# Patient Record
Sex: FEMALE | Race: WHITE | Employment: UNEMPLOYED | URBAN - METROPOLITAN AREA
[De-identification: names, ages, dates, MRNs, and addresses within clinical notes are randomized per-mention and may not be internally consistent; named-entity substitution may affect disease eponyms.]

---

## 2020-10-31 ENCOUNTER — APPOINTMENT (OUTPATIENT)
Dept: GENERAL RADIOLOGY | Age: 60
DRG: 247 | End: 2020-10-31
Payer: COMMERCIAL

## 2020-10-31 ENCOUNTER — HOSPITAL ENCOUNTER (INPATIENT)
Age: 60
LOS: 2 days | Discharge: HOME | DRG: 247 | End: 2020-11-02
Attending: EMERGENCY MEDICINE | Admitting: INTERNAL MEDICINE
Payer: COMMERCIAL

## 2020-10-31 PROBLEM — I21.19 ACUTE INFERIOR MYOCARDIAL INFARCTION (HCC): Status: ACTIVE | Noted: 2020-10-31

## 2020-10-31 LAB
ANION GAP SERPL CALCULATED.3IONS-SCNC: 13 MMOL/L (ref 3–16)
APTT: 32.9 SEC (ref 24.2–36.2)
BASOPHILS ABSOLUTE: 0.2 K/UL (ref 0–0.2)
BASOPHILS RELATIVE PERCENT: 1.3 %
BUN BLDV-MCNC: 15 MG/DL (ref 7–20)
CALCIUM SERPL-MCNC: 10.2 MG/DL (ref 8.3–10.6)
CHLORIDE BLD-SCNC: 96 MMOL/L (ref 99–110)
CHOLESTEROL, TOTAL: 278 MG/DL (ref 0–199)
CO2: 22 MMOL/L (ref 21–32)
CREAT SERPL-MCNC: 0.8 MG/DL (ref 0.6–1.1)
EKG ATRIAL RATE: 248 BPM
EKG DIAGNOSIS: NORMAL
EKG P AXIS: 63 DEGREES
EKG Q-T INTERVAL: 320 MS
EKG QRS DURATION: 92 MS
EKG QTC CALCULATION (BAZETT): 459 MS
EKG R AXIS: 54 DEGREES
EKG T AXIS: 86 DEGREES
EKG VENTRICULAR RATE: 124 BPM
EOSINOPHILS ABSOLUTE: 0 K/UL (ref 0–0.6)
EOSINOPHILS RELATIVE PERCENT: 0.2 %
ESTIMATED AVERAGE GLUCOSE: 168.6 MG/DL
GFR AFRICAN AMERICAN: >60
GFR NON-AFRICAN AMERICAN: >60
GLUCOSE BLD-MCNC: 221 MG/DL (ref 70–99)
HBA1C MFR BLD: 7.5 %
HCG QUALITATIVE: NEGATIVE
HCT VFR BLD CALC: 47.3 % (ref 36–48)
HDLC SERPL-MCNC: 38 MG/DL (ref 40–60)
HEMOGLOBIN: 15.3 G/DL (ref 12–16)
LDL CHOLESTEROL CALCULATED: ABNORMAL MG/DL
LDL CHOLESTEROL DIRECT: 162 MG/DL
LV EF: 48 %
LVEF MODALITY: NORMAL
LYMPHOCYTES ABSOLUTE: 2.6 K/UL (ref 1–5.1)
LYMPHOCYTES RELATIVE PERCENT: 19.3 %
MCH RBC QN AUTO: 29.1 PG (ref 26–34)
MCHC RBC AUTO-ENTMCNC: 32.3 G/DL (ref 31–36)
MCV RBC AUTO: 90.3 FL (ref 80–100)
MONOCYTES ABSOLUTE: 0.8 K/UL (ref 0–1.3)
MONOCYTES RELATIVE PERCENT: 6.3 %
NEUTROPHILS ABSOLUTE: 9.7 K/UL (ref 1.7–7.7)
NEUTROPHILS RELATIVE PERCENT: 72.9 %
PDW BLD-RTO: 13.4 % (ref 12.4–15.4)
PLATELET # BLD: 413 K/UL (ref 135–450)
PMV BLD AUTO: 8.4 FL (ref 5–10.5)
POC ACT LR: 234 SEC
POC ACT LR: 276 SEC
POTASSIUM REFLEX MAGNESIUM: 4.1 MMOL/L (ref 3.5–5.1)
PRO-BNP: 2205 PG/ML (ref 0–124)
RBC # BLD: 5.24 M/UL (ref 4–5.2)
SARS-COV-2, NAAT: NOT DETECTED
SODIUM BLD-SCNC: 131 MMOL/L (ref 136–145)
TRIGL SERPL-MCNC: 605 MG/DL (ref 0–150)
TROPONIN: 0.24 NG/ML
VLDLC SERPL CALC-MCNC: ABNORMAL MG/DL
WBC # BLD: 13.3 K/UL (ref 4–11)

## 2020-10-31 PROCEDURE — C1894 INTRO/SHEATH, NON-LASER: HCPCS

## 2020-10-31 PROCEDURE — 83880 ASSAY OF NATRIURETIC PEPTIDE: CPT

## 2020-10-31 PROCEDURE — C1874 STENT, COATED/COV W/DEL SYS: HCPCS

## 2020-10-31 PROCEDURE — 99152 MOD SED SAME PHYS/QHP 5/>YRS: CPT

## 2020-10-31 PROCEDURE — 94640 AIRWAY INHALATION TREATMENT: CPT

## 2020-10-31 PROCEDURE — APPNB45 APP NON BILLABLE 31-45 MINUTES: Performed by: NURSE PRACTITIONER

## 2020-10-31 PROCEDURE — 99223 1ST HOSP IP/OBS HIGH 75: CPT | Performed by: INTERNAL MEDICINE

## 2020-10-31 PROCEDURE — 6370000000 HC RX 637 (ALT 250 FOR IP): Performed by: NURSE PRACTITIONER

## 2020-10-31 PROCEDURE — 6370000000 HC RX 637 (ALT 250 FOR IP): Performed by: EMERGENCY MEDICINE

## 2020-10-31 PROCEDURE — 85025 COMPLETE CBC W/AUTO DIFF WBC: CPT

## 2020-10-31 PROCEDURE — C8929 TTE W OR WO FOL WCON,DOPPLER: HCPCS

## 2020-10-31 PROCEDURE — 85347 COAGULATION TIME ACTIVATED: CPT

## 2020-10-31 PROCEDURE — 2580000003 HC RX 258: Performed by: INTERNAL MEDICINE

## 2020-10-31 PROCEDURE — 94760 N-INVAS EAR/PLS OXIMETRY 1: CPT

## 2020-10-31 PROCEDURE — 6360000002 HC RX W HCPCS: Performed by: EMERGENCY MEDICINE

## 2020-10-31 PROCEDURE — 93010 ELECTROCARDIOGRAM REPORT: CPT | Performed by: INTERNAL MEDICINE

## 2020-10-31 PROCEDURE — 80048 BASIC METABOLIC PNL TOTAL CA: CPT

## 2020-10-31 PROCEDURE — 6370000000 HC RX 637 (ALT 250 FOR IP): Performed by: INTERNAL MEDICINE

## 2020-10-31 PROCEDURE — C1725 CATH, TRANSLUMIN NON-LASER: HCPCS

## 2020-10-31 PROCEDURE — B2111ZZ FLUOROSCOPY OF MULTIPLE CORONARY ARTERIES USING LOW OSMOLAR CONTRAST: ICD-10-PCS | Performed by: INTERNAL MEDICINE

## 2020-10-31 PROCEDURE — C1769 GUIDE WIRE: HCPCS

## 2020-10-31 PROCEDURE — 83721 ASSAY OF BLOOD LIPOPROTEIN: CPT

## 2020-10-31 PROCEDURE — 2500000003 HC RX 250 WO HCPCS: Performed by: EMERGENCY MEDICINE

## 2020-10-31 PROCEDURE — C1760 CLOSURE DEV, VASC: HCPCS

## 2020-10-31 PROCEDURE — B2151ZZ FLUOROSCOPY OF LEFT HEART USING LOW OSMOLAR CONTRAST: ICD-10-PCS | Performed by: INTERNAL MEDICINE

## 2020-10-31 PROCEDURE — U0002 COVID-19 LAB TEST NON-CDC: HCPCS

## 2020-10-31 PROCEDURE — 92941 PRQ TRLML REVSC TOT OCCL AMI: CPT | Performed by: INTERNAL MEDICINE

## 2020-10-31 PROCEDURE — 6360000004 HC RX CONTRAST MEDICATION: Performed by: EMERGENCY MEDICINE

## 2020-10-31 PROCEDURE — 93005 ELECTROCARDIOGRAM TRACING: CPT | Performed by: EMERGENCY MEDICINE

## 2020-10-31 PROCEDURE — 6370000000 HC RX 637 (ALT 250 FOR IP)

## 2020-10-31 PROCEDURE — 96365 THER/PROPH/DIAG IV INF INIT: CPT

## 2020-10-31 PROCEDURE — 6360000002 HC RX W HCPCS: Performed by: INTERNAL MEDICINE

## 2020-10-31 PROCEDURE — 99285 EMERGENCY DEPT VISIT HI MDM: CPT

## 2020-10-31 PROCEDURE — 85730 THROMBOPLASTIN TIME PARTIAL: CPT

## 2020-10-31 PROCEDURE — 2709999900 HC NON-CHARGEABLE SUPPLY

## 2020-10-31 PROCEDURE — 84484 ASSAY OF TROPONIN QUANT: CPT

## 2020-10-31 PROCEDURE — 71045 X-RAY EXAM CHEST 1 VIEW: CPT

## 2020-10-31 PROCEDURE — 4A023N7 MEASUREMENT OF CARDIAC SAMPLING AND PRESSURE, LEFT HEART, PERCUTANEOUS APPROACH: ICD-10-PCS | Performed by: INTERNAL MEDICINE

## 2020-10-31 PROCEDURE — 84703 CHORIONIC GONADOTROPIN ASSAY: CPT

## 2020-10-31 PROCEDURE — 92941 PRQ TRLML REVSC TOT OCCL AMI: CPT

## 2020-10-31 PROCEDURE — 93458 L HRT ARTERY/VENTRICLE ANGIO: CPT

## 2020-10-31 PROCEDURE — 83036 HEMOGLOBIN GLYCOSYLATED A1C: CPT

## 2020-10-31 PROCEDURE — 93458 L HRT ARTERY/VENTRICLE ANGIO: CPT | Performed by: INTERNAL MEDICINE

## 2020-10-31 PROCEDURE — 6360000002 HC RX W HCPCS

## 2020-10-31 PROCEDURE — 2500000003 HC RX 250 WO HCPCS

## 2020-10-31 PROCEDURE — 027034Z DILATION OF CORONARY ARTERY, ONE ARTERY WITH DRUG-ELUTING INTRALUMINAL DEVICE, PERCUTANEOUS APPROACH: ICD-10-PCS | Performed by: INTERNAL MEDICINE

## 2020-10-31 PROCEDURE — 80061 LIPID PANEL: CPT

## 2020-10-31 PROCEDURE — 96375 TX/PRO/DX INJ NEW DRUG ADDON: CPT

## 2020-10-31 PROCEDURE — 6360000004 HC RX CONTRAST MEDICATION: Performed by: NURSE PRACTITIONER

## 2020-10-31 PROCEDURE — C1887 CATHETER, GUIDING: HCPCS

## 2020-10-31 PROCEDURE — 2060000000 HC ICU INTERMEDIATE R&B

## 2020-10-31 RX ORDER — SODIUM CHLORIDE 0.9 % (FLUSH) 0.9 %
10 SYRINGE (ML) INJECTION PRN
Status: DISCONTINUED | OUTPATIENT
Start: 2020-10-31 | End: 2020-11-02 | Stop reason: HOSPADM

## 2020-10-31 RX ORDER — FLUTICASONE PROPIONATE 50 MCG
2 SPRAY, SUSPENSION (ML) NASAL DAILY
Status: DISCONTINUED | OUTPATIENT
Start: 2020-10-31 | End: 2020-11-02 | Stop reason: HOSPADM

## 2020-10-31 RX ORDER — METHOCARBAMOL 750 MG/1
750 TABLET, FILM COATED ORAL 3 TIMES DAILY
COMMUNITY
Start: 2020-09-16

## 2020-10-31 RX ORDER — FUROSEMIDE 40 MG/1
40 TABLET ORAL DAILY
Status: DISCONTINUED | OUTPATIENT
Start: 2020-10-31 | End: 2020-11-02 | Stop reason: HOSPADM

## 2020-10-31 RX ORDER — ALBUTEROL SULFATE 90 UG/1
2 AEROSOL, METERED RESPIRATORY (INHALATION) EVERY 6 HOURS PRN
Status: DISCONTINUED | OUTPATIENT
Start: 2020-10-31 | End: 2020-11-01

## 2020-10-31 RX ORDER — SODIUM CHLORIDE 0.9 % (FLUSH) 0.9 %
10 SYRINGE (ML) INJECTION EVERY 12 HOURS SCHEDULED
Status: DISCONTINUED | OUTPATIENT
Start: 2020-10-31 | End: 2020-11-02 | Stop reason: HOSPADM

## 2020-10-31 RX ORDER — VALSARTAN 80 MG/1
40 TABLET ORAL DAILY
Status: DISCONTINUED | OUTPATIENT
Start: 2020-10-31 | End: 2020-11-02 | Stop reason: HOSPADM

## 2020-10-31 RX ORDER — ASPIRIN 81 MG/1
324 TABLET, CHEWABLE ORAL ONCE
Status: COMPLETED | OUTPATIENT
Start: 2020-10-31 | End: 2020-10-31

## 2020-10-31 RX ORDER — METOPROLOL SUCCINATE 25 MG/1
25 TABLET, EXTENDED RELEASE ORAL DAILY
Status: DISCONTINUED | OUTPATIENT
Start: 2020-10-31 | End: 2020-11-02 | Stop reason: HOSPADM

## 2020-10-31 RX ORDER — ONDANSETRON 2 MG/ML
4 INJECTION INTRAMUSCULAR; INTRAVENOUS EVERY 6 HOURS PRN
Status: DISCONTINUED | OUTPATIENT
Start: 2020-10-31 | End: 2020-11-02 | Stop reason: HOSPADM

## 2020-10-31 RX ORDER — HEPARIN SODIUM 1000 [USP'U]/ML
30 INJECTION, SOLUTION INTRAVENOUS; SUBCUTANEOUS PRN
Status: DISCONTINUED | OUTPATIENT
Start: 2020-10-31 | End: 2020-10-31

## 2020-10-31 RX ORDER — SODIUM CHLORIDE 9 MG/ML
INJECTION, SOLUTION INTRAVENOUS CONTINUOUS
Status: DISCONTINUED | OUTPATIENT
Start: 2020-10-31 | End: 2020-10-31

## 2020-10-31 RX ORDER — ATORVASTATIN CALCIUM 80 MG/1
80 TABLET, FILM COATED ORAL NIGHTLY
Status: DISCONTINUED | OUTPATIENT
Start: 2020-10-31 | End: 2020-11-02 | Stop reason: HOSPADM

## 2020-10-31 RX ORDER — BUDESONIDE AND FORMOTEROL FUMARATE DIHYDRATE 160; 4.5 UG/1; UG/1
2 AEROSOL RESPIRATORY (INHALATION) 2 TIMES DAILY
Status: DISCONTINUED | OUTPATIENT
Start: 2020-10-31 | End: 2020-11-02 | Stop reason: HOSPADM

## 2020-10-31 RX ORDER — FLUTICASONE FUROATE AND VILANTEROL 200; 25 UG/1; UG/1
1 POWDER RESPIRATORY (INHALATION) DAILY
COMMUNITY
Start: 2020-04-21 | End: 2021-10-29

## 2020-10-31 RX ORDER — HEPARIN SODIUM 1000 [USP'U]/ML
4000 INJECTION, SOLUTION INTRAVENOUS; SUBCUTANEOUS ONCE
Status: COMPLETED | OUTPATIENT
Start: 2020-10-31 | End: 2020-10-31

## 2020-10-31 RX ORDER — FUROSEMIDE 40 MG/1
40 TABLET ORAL DAILY
Status: ON HOLD | COMMUNITY
Start: 2020-10-19 | End: 2020-11-02 | Stop reason: SDUPTHER

## 2020-10-31 RX ORDER — IPRATROPIUM BROMIDE AND ALBUTEROL SULFATE 2.5; .5 MG/3ML; MG/3ML
1 SOLUTION RESPIRATORY (INHALATION) EVERY 4 HOURS PRN
Status: DISCONTINUED | OUTPATIENT
Start: 2020-10-31 | End: 2020-11-01

## 2020-10-31 RX ORDER — MONTELUKAST SODIUM 10 MG/1
10 TABLET ORAL NIGHTLY
COMMUNITY
Start: 2020-09-15 | End: 2022-05-12 | Stop reason: ALTCHOICE

## 2020-10-31 RX ORDER — FLUTICASONE PROPIONATE 50 MCG
2 SPRAY, SUSPENSION (ML) NASAL DAILY
COMMUNITY
Start: 2020-10-19

## 2020-10-31 RX ORDER — HEPARIN SODIUM 1000 [USP'U]/ML
60 INJECTION, SOLUTION INTRAVENOUS; SUBCUTANEOUS PRN
Status: DISCONTINUED | OUTPATIENT
Start: 2020-10-31 | End: 2020-10-31

## 2020-10-31 RX ORDER — ALBUTEROL SULFATE 90 UG/1
2 AEROSOL, METERED RESPIRATORY (INHALATION) 4 TIMES DAILY PRN
COMMUNITY
Start: 2020-10-19 | End: 2020-11-17 | Stop reason: SDUPTHER

## 2020-10-31 RX ORDER — ATROPINE SULFATE 0.4 MG/ML
0.5 AMPUL (ML) INJECTION
Status: DISPENSED | OUTPATIENT
Start: 2020-10-31 | End: 2020-10-31

## 2020-10-31 RX ORDER — POTASSIUM CHLORIDE 750 MG/1
10 TABLET, FILM COATED, EXTENDED RELEASE ORAL DAILY
Status: DISCONTINUED | OUTPATIENT
Start: 2020-10-31 | End: 2020-11-02 | Stop reason: HOSPADM

## 2020-10-31 RX ORDER — HEPARIN SODIUM 10000 [USP'U]/100ML
10 INJECTION, SOLUTION INTRAVENOUS CONTINUOUS
Status: DISCONTINUED | OUTPATIENT
Start: 2020-10-31 | End: 2020-10-31 | Stop reason: ALTCHOICE

## 2020-10-31 RX ORDER — POTASSIUM CHLORIDE 750 MG/1
10 TABLET, FILM COATED, EXTENDED RELEASE ORAL DAILY
Status: ON HOLD | COMMUNITY
Start: 2020-09-16 | End: 2020-11-02 | Stop reason: HOSPADM

## 2020-10-31 RX ORDER — MORPHINE SULFATE 4 MG/ML
4 INJECTION, SOLUTION INTRAMUSCULAR; INTRAVENOUS ONCE
Status: COMPLETED | OUTPATIENT
Start: 2020-10-31 | End: 2020-10-31

## 2020-10-31 RX ORDER — ATORVASTATIN CALCIUM 40 MG/1
40 TABLET, FILM COATED ORAL NIGHTLY
Status: DISCONTINUED | OUTPATIENT
Start: 2020-10-31 | End: 2020-10-31

## 2020-10-31 RX ORDER — ASPIRIN 81 MG/1
81 TABLET, CHEWABLE ORAL DAILY
Status: DISCONTINUED | OUTPATIENT
Start: 2020-11-01 | End: 2020-11-02 | Stop reason: HOSPADM

## 2020-10-31 RX ORDER — MONTELUKAST SODIUM 10 MG/1
10 TABLET ORAL NIGHTLY
Status: DISCONTINUED | OUTPATIENT
Start: 2020-10-31 | End: 2020-11-02 | Stop reason: HOSPADM

## 2020-10-31 RX ORDER — EPTIFIBATIDE 0.75 MG/ML
2 INJECTION, SOLUTION INTRAVENOUS CONTINUOUS
Status: DISPENSED | OUTPATIENT
Start: 2020-10-31 | End: 2020-10-31

## 2020-10-31 RX ORDER — FLUTICASONE FUROATE AND VILANTEROL 200; 25 UG/1; UG/1
1 POWDER RESPIRATORY (INHALATION) DAILY
Status: DISCONTINUED | OUTPATIENT
Start: 2020-10-31 | End: 2020-10-31

## 2020-10-31 RX ORDER — ACETAMINOPHEN 325 MG/1
650 TABLET ORAL EVERY 4 HOURS PRN
Status: DISCONTINUED | OUTPATIENT
Start: 2020-10-31 | End: 2020-11-01

## 2020-10-31 RX ORDER — MORPHINE SULFATE 2 MG/ML
2 INJECTION, SOLUTION INTRAMUSCULAR; INTRAVENOUS
Status: ACTIVE | OUTPATIENT
Start: 2020-10-31 | End: 2020-10-31

## 2020-10-31 RX ORDER — PREDNISONE 20 MG/1
TABLET ORAL
Status: ON HOLD | COMMUNITY
Start: 2020-08-18 | End: 2020-10-31

## 2020-10-31 RX ORDER — AMOXICILLIN AND CLAVULANATE POTASSIUM 875; 125 MG/1; MG/1
1 TABLET, FILM COATED ORAL 2 TIMES DAILY
Status: ON HOLD | COMMUNITY
Start: 2020-10-30 | End: 2020-11-02 | Stop reason: HOSPADM

## 2020-10-31 RX ORDER — IPRATROPIUM BROMIDE AND ALBUTEROL SULFATE 2.5; .5 MG/3ML; MG/3ML
SOLUTION RESPIRATORY (INHALATION)
COMMUNITY
Start: 2020-04-06

## 2020-10-31 RX ADMIN — SODIUM CHLORIDE, PRESERVATIVE FREE 10 ML: 5 INJECTION INTRAVENOUS at 21:17

## 2020-10-31 RX ADMIN — EPTIFIBATIDE 2 MCG/KG/MIN: 75 INJECTION INTRAVENOUS at 07:00

## 2020-10-31 RX ADMIN — VALSARTAN 40 MG: 80 TABLET, FILM COATED ORAL at 10:25

## 2020-10-31 RX ADMIN — HEPARIN SODIUM 10 ML/HR: 10000 INJECTION, SOLUTION INTRAVENOUS at 01:41

## 2020-10-31 RX ADMIN — METOPROLOL TARTRATE 25 MG: 25 TABLET, FILM COATED ORAL at 01:44

## 2020-10-31 RX ADMIN — SODIUM CHLORIDE, PRESERVATIVE FREE 10 ML: 5 INJECTION INTRAVENOUS at 07:45

## 2020-10-31 RX ADMIN — TICAGRELOR 90 MG: 90 TABLET ORAL at 21:17

## 2020-10-31 RX ADMIN — IOPAMIDOL 73 ML: 755 INJECTION, SOLUTION INTRAVENOUS at 02:50

## 2020-10-31 RX ADMIN — ATORVASTATIN CALCIUM 80 MG: 80 TABLET, FILM COATED ORAL at 21:17

## 2020-10-31 RX ADMIN — BUDESONIDE AND FORMOTEROL FUMARATE DIHYDRATE 2 PUFF: 160; 4.5 AEROSOL RESPIRATORY (INHALATION) at 12:18

## 2020-10-31 RX ADMIN — FLUTICASONE PROPIONATE 2 SPRAY: 50 SPRAY, METERED NASAL at 10:25

## 2020-10-31 RX ADMIN — BUDESONIDE AND FORMOTEROL FUMARATE DIHYDRATE 2 PUFF: 160; 4.5 AEROSOL RESPIRATORY (INHALATION) at 19:35

## 2020-10-31 RX ADMIN — ALBUTEROL SULFATE 2 PUFF: 108 AEROSOL, METERED RESPIRATORY (INHALATION) at 12:18

## 2020-10-31 RX ADMIN — FUROSEMIDE 40 MG: 40 TABLET ORAL at 10:25

## 2020-10-31 RX ADMIN — ASPIRIN 324 MG: 81 TABLET, CHEWABLE ORAL at 01:37

## 2020-10-31 RX ADMIN — PERFLUTREN 1.65 MG: 6.52 INJECTION, SUSPENSION INTRAVENOUS at 10:01

## 2020-10-31 RX ADMIN — MONTELUKAST 10 MG: 10 TABLET, FILM COATED ORAL at 21:17

## 2020-10-31 RX ADMIN — METOPROLOL SUCCINATE 25 MG: 25 TABLET, EXTENDED RELEASE ORAL at 03:29

## 2020-10-31 RX ADMIN — HEPARIN SODIUM 4000 UNITS: 1000 INJECTION INTRAVENOUS; SUBCUTANEOUS at 01:41

## 2020-10-31 RX ADMIN — MORPHINE SULFATE 4 MG: 4 INJECTION, SOLUTION INTRAMUSCULAR; INTRAVENOUS at 01:37

## 2020-10-31 RX ADMIN — TICAGRELOR 90 MG: 90 TABLET ORAL at 07:45

## 2020-10-31 RX ADMIN — POTASSIUM CHLORIDE 10 MEQ: 750 TABLET, FILM COATED, EXTENDED RELEASE ORAL at 10:25

## 2020-10-31 ASSESSMENT — ENCOUNTER SYMPTOMS
CHEST TIGHTNESS: 0
EYE DISCHARGE: 0
WHEEZING: 0
ABDOMINAL DISTENTION: 0
SHORTNESS OF BREATH: 0
STRIDOR: 0
EYE REDNESS: 0
CHOKING: 0
PHOTOPHOBIA: 0
APNEA: 0
EYE ITCHING: 0
COUGH: 0
EYE PAIN: 0

## 2020-10-31 ASSESSMENT — PAIN SCALES - GENERAL
PAINLEVEL_OUTOF10: 0
PAINLEVEL_OUTOF10: 10
PAINLEVEL_OUTOF10: 0

## 2020-10-31 ASSESSMENT — PAIN DESCRIPTION - ONSET: ONSET: ON-GOING

## 2020-10-31 ASSESSMENT — PAIN DESCRIPTION - PROGRESSION: CLINICAL_PROGRESSION: NOT CHANGED

## 2020-10-31 ASSESSMENT — PAIN DESCRIPTION - LOCATION: LOCATION: CHEST

## 2020-10-31 ASSESSMENT — PAIN DESCRIPTION - DESCRIPTORS: DESCRIPTORS: ACHING;DISCOMFORT

## 2020-10-31 ASSESSMENT — PAIN DESCRIPTION - FREQUENCY: FREQUENCY: CONTINUOUS

## 2020-10-31 ASSESSMENT — PAIN DESCRIPTION - ORIENTATION: ORIENTATION: MID

## 2020-10-31 ASSESSMENT — PAIN - FUNCTIONAL ASSESSMENT: PAIN_FUNCTIONAL_ASSESSMENT: PREVENTS OR INTERFERES SOME ACTIVE ACTIVITIES AND ADLS

## 2020-10-31 NOTE — PROGRESS NOTES
Patient arrived to room 5111 from CVICU. Patient AO X4, ambulatory. Placed on monitor per MD orders. Tele verified with [facility]. Dinner tray ordered. Oriented to room, call light. . Patient denies needs at this time. Call light within reach.

## 2020-10-31 NOTE — H&P
[address]         Reason for Consultation/Chief Complaint: \"I have been having chest pain for 12 hours. \"       History of Present Illness:  Diana Ponce is a 64 y.o. patient who presented to the hospital with complaints of chest pain that started 12 hours ago. .  The patient has asthma and sinusitis. The Chest pain became so severe that she sought medical attention and was immediately found to have an acute inferior STEMI. Past Medical History:   has a past medical history of Asthma, Elevated C-reactive protein (CRP), Hemorrhoid, Hyperlipidemia, Hypertriglyceridemia, Menopause, Osteoarthritis, and Overweight(278.02). Surgical History:   has a past surgical history that includes Tubal ligation (1986). Social History:   reports that she has been smoking cigarettes. She has a 37.00 pack-year smoking history. She has never used smokeless tobacco. She reports that she does not drink alcohol or use drugs. Family History:  No evidence for sudden cardiac death or premature CAD    Home Medications:  Were reviewed and are listed in nursing record. and/or listed below  Prior to Admission medications    Medication Sig Start Date End Date Taking? Authorizing Provider   albuterol sulfate HFA (PROAIR HFA) 108 (90 Base) MCG/ACT inhaler Inhale 2 puffs into the lungs every 6 hours as needed for Wheezing 8/12/18   Taiwo Sahu MD   predniSONE (DELTASONE) 10 MG tablet Take 10 mg by mouth daily    Historical Provider, MD        [facility] Medications:       heparin (PORCINE) Infusion 10 mL/hr (10/31/20 0141)       Allergies:  Penicillins     Review of Systems:     A 14 ROS obtained and negative except as mentioned in HPI. · Constitutional: there has been no unanticipated weight loss. · Eyes: No visual changes or diplopia. No scleral icterus. · ENT: No Headaches, hearing loss or vertigo. No mouth sores or sore throat. · Cardiovascular: No loss of consciousness.  No hemoptysis, pleuritic pain, or phlebitis. Chest pain  · Respiratory: No cough or wheezing, no sputum production. No hematemesis. · Gastrointestinal: No abdominal pain, appetite loss, blood in stools. No change in bowel or bladder habits. · Genitourinary: No dysuria, or hematuria. · Musculoskeletal:  No gait disturbance, weakness or joint complaints. · Integumentary: No rash or pruritis. · Neurological: No headache, diplopia,numbness or tingling. No change in gait, balance, coordination, mood, affect, memory, mentation, behavior. · Psychiatric: No anxiety,  · Endocrine: No malaise,  · Hematologic/Lymphatic: No abnormal bruising  · Allergic/Immunologic: No nasal congestion      Physical Examination:    Vitals:    10/31/20 0205   BP: 122/78   Pulse: 115   Resp: 23   SpO2:     Weight: 189 lb 13.1 oz (86.1 kg)         General Appearance:  Alert, cooperative, no distress, appears stated age   Head:  Normocephalic, without obvious abnormality, atraumatic   Eyes:  PERRL   Nose: Nares normal,   Neck: Supple, JVP normal   Lungs:   Clear to auscultation bilaterally, respirations unlabored   Chest Wall:  No tenderness or deformity   Heart:  Regular rate and rhythm, normal S1, S2 normal, no murmur, No rub. No S3 / S4  gallop   Abdomen:   Soft, non-tender, +bowel sounds   Extremities: no cyanosis, no clubbing , tr edema   Pulses: Symmetric extremities   Skin: no gross lesions or rashes   Pysch: Normal mood and affect   Neurologic: No gross deficits.   CN II - XII grossly intact        Labs  CBC:   Lab Results   Component Value Date    WBC 13.3 10/31/2020    RBC 5.24 10/31/2020    HGB 15.3 10/31/2020    HCT 47.3 10/31/2020    MCV 90.3 10/31/2020    RDW 13.4 10/31/2020     10/31/2020     CMP:    Lab Results   Component Value Date     10/31/2020    K 4.1 10/31/2020    CL 96 10/31/2020    CO2 22 10/31/2020    BUN 15 10/31/2020    CREATININE 0.8 10/31/2020    GFRAA >60 10/31/2020    GFRAA >60 03/28/2011    AGRATIO 1.3 08/12/2018    LABGLOM >60 10/31/2020    GLUCOSE 221 10/31/2020    PROT 7.2 08/12/2018    PROT 7.0 12/01/2010    CALCIUM 10.2 10/31/2020    BILITOT 0.3 08/12/2018    ALKPHOS 79 08/12/2018    AST 16 08/12/2018    ALT 20 08/12/2018     PT/INR:  No results found for: PTINR  Recent Labs     10/31/20  0137   TROPONINI 0.24*       EKG:  SR with inferior ST elevation  Assessment  Patient Active Problem List   Diagnosis    Patient overweight    Hemorrhoid    Osteoarthritis    Hyperlipidemia    Menopause    Vitamin D deficiency        Impression:  Acute inferior MI. Recommendations:    I had the opportunity to review the clinical symptoms and presentation of [address] I recommend that the patient undergo further cardiac evaluation with emergency cardiac catheterization with possible coronary intervention upon her RCA   I recommend that the patient be treated with toprol and integrelin and brillinta and statin  The patient will require ICU managememnt and is critically ill. Tobacco use was discussed with the patient and educated on the negative effects. I have asked the patient to not utilize these agents. Thank you for allowing to us to participate in the care or [address] The note was completed using EMR. Every effort was made to ensure accuracy; however, inadvertent computerized transcription errors may be present.        Ros Kingston MD [address] [facility]

## 2020-10-31 NOTE — CONSULTS
Pt seen on 10/31/20 by Dr. Rohith May, please see consult note.     BEATRIZ Torres  [facility]

## 2020-10-31 NOTE — PROGRESS NOTES
[address]   Daily Progress Note      Admit Date:  10/31/2020    Reason for follow up visit: Inferior MI    CC: \"I feel good this AM.\"    63 y/o female with PMH significant for tobacco use, COPD and mixed dyslipidemia admitted with inferior STEMI. She presented to [facility] with c/o chest pain and ECG showed inferior ST elevation. She was taken to cath lab and underwent placement of stent to occluded RCA. Interval History:  Pt. seen and examined; records reviewed  BP stable. Denies chest pain   Has some chronic SOB and wheezes    Review of Systems:   Did not obtain (seen earlier this AM)    Objective:   /68   Pulse 90   Temp 97.7 °F (36.5 °C) (Oral)   Resp 16   Ht 5' 1\" (1.549 m)   Wt 185 lb 13.6 oz (84.3 kg)   SpO2 98%   BMI 35.12 kg/m²       Intake/Output Summary (Last 24 hours) at 10/31/2020 0903  Last data filed at 10/31/2020 0745  Gross per 24 hour   Intake 10 ml   Output --   Net 10 ml     Wt Readings from Last 3 Encounters:   10/31/20 185 lb 13.6 oz (84.3 kg)   08/12/18 183 lb 6.8 oz (83.2 kg)   11/16/17 181 lb 14.1 oz (82.5 kg)       Physical Exam:  General: In no acute distress. Awake, alert, and oriented X4. Up in chair  Skin:  Warm and dry. No new appearing rashes or lesions. Neck:  Supple. No JVD or carotid bruit appreciated. Chest: Lungs with scattered expiratory wheezes  Cardiovascular:  RRR. Normal S1 and S2. No murmur/gallop or rub   Abdomen:  soft, nontender, nondistended, +bowel sounds. Extremities:  No LE edema. No clubbing or cyanosis. 2+ bilateral radial/DP. PT pulses; cap refill brisk .  R groin site unremarkable    Medications:    sodium chloride flush  10 mL Intravenous 2 times per day    atorvastatin  40 mg Oral Nightly    metoprolol succinate  25 mg Oral Daily    [START ON 11/1/2020] aspirin  81 mg Oral Daily    ticagrelor  90 mg Oral BID      heparin (PORCINE) Infusion Stopped (10/31/20 0300)    sodium chloride 125 mL/hr at 10/31/20 0311    eptifibatide 2 mcg/kg/min (10/31/20 0700)     sodium chloride flush, acetaminophen, atropine, morphine, ondansetron, perflutren lipid microspheres    Lab Data:  CBC:   Recent Labs     10/31/20  0137   WBC 13.3*   HGB 15.3        BMP:    Recent Labs     10/31/20  0137   *   K 4.1   CO2 22   BUN 15   CREATININE 0.8     LIVR: No results for input(s): AST, ALT in the last 72 hours. INR:  No results for input(s): INR in the last 72 hours. APTT:   Recent Labs     10/31/20  0137   APTT 32.9     Results for Yolanda Ruiz (MRN [id number]) as of 10/31/2020 09:07   Ref. Range 10/31/2020 01:37   Pro-BNP Latest Ref Range: 0 - 124 pg/mL 2,205 (H)   Troponin Latest Ref Range: <0.01 ng/mL 0.24 (H)     Results for Yolanda Ruiz (MRN [id number]) as of 10/31/2020 09:07   Ref. Range 10/31/2020 04:40   Cholesterol, Total Latest Ref Range: 0 - 199 mg/dL 278 (H)   HDL Cholesterol Latest Ref Range: 40 - 60 mg/dL 38 (L)   LDL Calculated Latest Ref Range: <100 mg/dL see below   LDL Direct Latest Ref Range: <100 mg/dL 162 (H)   Triglycerides Latest Ref Range: 0 - 150 mg/dL 605 (H)   VLDL Cholesterol Calculated Latest Ref Range: Not Established mg/dL see below     10/31/2020 Echo:  Pending    ECG:  Sinus tachycardia with inferior ST elevation    Cardiac cath 10/31/2020:  LM normal  LCX: 50% OM1  LAD: patent  RCA: subtotal occlusion; EMBER placed (Xience 3.5 x 15 mm)  LVEF 40%; inferior HK    Telemetry: Sinus rhythm-sinus tachycardia    Assessment/Plan:    1. Acute inferior STEMI  -S/P EMBER to RCA  -ischemic cardiomyopathy with LVEF 40%  -no recurrent angina  -continue ASA, Brilinta, Toprol and statin  -add ARB  -check echo    2. Mixed hyperlipidemia  -     -high intensity statin added    3. Hyperglycemia  -sliding scale insulin  -check HgbA1c    4. Tobacco use  -smoking cessation emphasized    5.  COPD  -follows pulmonary at [address]  -nebulizer and inhalers at home      Electronically signed by Baltazar Jaimes ENZWEILER, APRN - CNP on 10/31/2020 at 9:03 AM

## 2020-10-31 NOTE — PROGRESS NOTES
Pt is refusing point of care blood glucose testing and coverage. Pt states she is not diabetic and therefore does not need it. Risks of high blood glucose explained to pt, but pt still declined testing and coverage.     Electronically signed by Juana Carmona RN on 10/31/2020 at 11:53 AM

## 2020-10-31 NOTE — PROGRESS NOTES
0300:  Pt transferred from cath lab, in CVU bed, on portable monitor. Pt admitted to room 1310 in CVU w/ diagnosis of STEMI, after angioplasty and EMBER placed in RCA. R groin angiosealed and currently soft w/o s/s of bleeding or hematoma. Admission assessment and history started. See flow sheets. Denies pain, shortness of breath, n/v or any other needs or c/o. Pt's  brought to bedside and soon after Dr. Felecia Baker arrived to bedside and updated pt and her . Pt oriented to room and CVU routines, including fall precautions. Continuing ot monitor. 0710:  No acute events since admission. Change of shift report given to oncoming day shift RN, Ghazal Cesar (and RM Estrada). Bedside handoff completed at this time.

## 2020-10-31 NOTE — ED PROVIDER NOTES
[address] [facility]      Pt Name: Yao Hill  MRN: [id number]  [place] 1960  Date of evaluation: 10/31/2020  Provider: Francois Gan MD    CHIEF COMPLAINT     Chest pain    HISTORY OF PRESENT ILLNESS    Yao Hill is a 64 y.o. female who presents to the emergency department with chest pain. Substernal chest pain for 15 hours. Pain is acute 9/10 pressure like and constant in nature. Pressing on her chest makes symptoms better. This has never happened before. No other associated symptoms other than previously mentioned. Nursing Notes were reviewed. Including nursing noted for FM, Surgical History, Past Medical History, Social History, vitals, and allergies; agree with all. REVIEW OF SYSTEMS       Review of Systems   Constitutional: Negative for activity change, appetite change, chills, diaphoresis, fatigue, fever and unexpected weight change. HENT: Negative for congestion, dental problem, drooling and ear discharge. Eyes: Negative for photophobia, pain, discharge, redness and itching. Respiratory: Negative for apnea, cough, choking, chest tightness, shortness of breath, wheezing and stridor. Cardiovascular: Positive for chest pain. Negative for leg swelling. Gastrointestinal: Negative for abdominal distention. Endocrine: Negative for polyphagia and polyuria. Genitourinary: Negative for vaginal bleeding, vaginal discharge and vaginal pain. Musculoskeletal: Negative for arthralgias. Neurological: Negative for dizziness, facial asymmetry and headaches. Hematological: Negative for adenopathy. Does not bruise/bleed easily. Psychiatric/Behavioral: Negative for agitation, behavioral problems, confusion, decreased concentration, dysphoric mood, hallucinations, self-injury, sleep disturbance and suicidal ideas. The patient is not nervous/anxious and is not hyperactive.       Except as noted above the remainder of the file    Stress: Not on file   Relationships    Social connections     Talks on phone: Not on file     Gets together: Not on file     Attends [employer] service: Not on file     Active member of club or organization: Not on file     Attends meetings of clubs or organizations: Not on file     Relationship status: Not on file    Intimate partner violence     Fear of current or ex partner: Not on file     Emotionally abused: Not on file     Physically abused: Not on file     Forced sexual activity: Not on file   Other Topics Concern    Not on file   Social History Narrative    Not on file       PHYSICAL EXAM       Vitals:    10/31/20 0131   BP: (!) 150/95   Pulse: 132   Resp: 30   SpO2: 95%   Weight: 189 lb 13.1 oz (86.1 kg)   Height: 5' 1\" (1.549 m)     Physical Exam  Vitals signs and nursing note reviewed. Constitutional:       General: She is in acute distress. Appearance: She is well-developed. She is ill-appearing. She is not toxic-appearing or diaphoretic. HENT:      Head: Normocephalic and atraumatic. Right Ear: External ear normal.      Left Ear: External ear normal.   Eyes:      General:         Right eye: No discharge. Left eye: No discharge. Conjunctiva/sclera: Conjunctivae normal.      Pupils: Pupils are equal, round, and reactive to light. Neck:      Musculoskeletal: Normal range of motion and neck supple. Cardiovascular:      Rate and Rhythm: Regular rhythm. Tachycardia present. Heart sounds: No murmur. Pulmonary:      Effort: Pulmonary effort is normal. No respiratory distress. Breath sounds: Normal breath sounds. No wheezing or rales. Abdominal:      General: Bowel sounds are normal. There is no distension. Palpations: Abdomen is soft. There is no mass. Tenderness: There is no abdominal tenderness. There is no guarding or rebound. Genitourinary:     Comments: Deferred  Musculoskeletal: Normal range of motion. General: No deformity. Skin:     General: Skin is warm. Findings: No erythema or rash. Neurological:      Mental Status: She is alert and oriented to person, place, and time. She is not disoriented. Cranial Nerves: No cranial nerve deficit. Motor: No atrophy or abnormal muscle tone. Coordination: Coordination normal.   Psychiatric:         Behavior: Behavior normal.         Thought Content: Thought content normal.       DIAGNOSTIC RESULTS     EKG: All EKG's are interpreted by the Emergency Department Physician who either signs or Co-signs this chart in the absence of acardiologist.    EKG shows STEMI inferior leads with reciprocal changes     RADIOLOGY:   Non-plain film images such as CT, Ultrasoundand MRI are read by the radiologist. Plain radiographic images are visualized and preliminarily interpreted by the emergency physician with the below findings:    CXR clear     ED BEDSIDE ULTRASOUND:   Performed by ED Physician - none    LABS:  Labs Reviewed   CBC WITH AUTO DIFFERENTIAL   BASIC METABOLIC PANEL W/ REFLEX TO MG FOR LOW K   TROPONIN   BRAIN NATRIURETIC PEPTIDE   HCG, SERUM, QUALITATIVE   APTT   APTT   COVID-19   COVID-19   COVID-19   COVID-19   COVID-19     All other labs were withinnormal range or not returned as of this dictation. EMERGENCY DEPARTMENT COURSE and DIFFERENTIAL DIAGNOSIS/MDM:     PMH, Surgical Hx, FH, Social Hx reviewed by myself (ETOH usage, Tobacco usage, Drug usage reviewed by myself, no pertinent Hx)- No Pertinent Hx     Old records were reviewed by me    STEMI    ASA    BB    Heparin    Cath Lab    Dr Arriola  emergently coming in    [facility] 27 was 41 minutes, excluding separately reportable procedures. There was a high probability of clinically significant/life threatening deterioration in the patient's condition which required my urgent intervention.         PROCEDURES:  Unlessotherwise noted below, none    FINAL IMPRESSION      1. ST elevation myocardial infarction (STEMI), unspecified artery [facility])          DISPOSITION/PLAN   DISPOSITION      Cath lab    (Please note that portions ofthis note were completed with a voice recognition program.  Efforts were made to edit the dictations but occasionally words are mis-transcribed.)    Debra Rees MD(electronically signed)  Attending Emergency Physician        Debra Rees MD  10/31/20 0911

## 2020-10-31 NOTE — PROCEDURES
[address] [place] [address]                            CARDIAC CATHETERIZATION    PATIENT NAME: Stephanie Oakley                  :        1960  MED REC NO:   [id number]                          ROOM:       [address]:   [de-identified]                           ADMIT DATE: 10/31/2020  PROVIDER:     Anshu Armando. Clarence Mon MD    DATE OF PROCEDURE:  10/31/2020    INDICATION FOR PROCEDURE:  The patient presented with severe chest pain. The chest pain had been ongoing for 12-15 hours at home prior to  presentation. She was triaged and found to have an acute inferior ST  elevation MI with Q-waves in II, III and AVF and reciprocal depressions  in leads V1, V2, V3. She requires emergent angiography for  risk-stratification intervention. Mallampati 2. ASA 2. PROCEDURE:  Prepped and draped in a sterile manner, locally anesthetized  with 1% lidocaine in the right femoral region. A 6-Dominican sheath was  placed in a retrograde manner in the right femoral artery over a  guidewire. JL4, 6-Dominican JR4 guide, and pigtail catheter were used  during the diagnostic procedure. All were aspirated and flushed prior  to use. All catheters were advanced under fluoroscopic guidance over a  guidewire and retracted over a guidewire. FINDINGS:  The left main coronary artery is normal.  It bifurcates and  gives off to left anterior descending and left circumflex coronary  arteries. The left circumflex coronary artery is normal proximally. The circumflex continues in the AV groove. There is on a bend of obtuse  marginal branch #1. She has a 50% stenosis. It is not flow-limiting. It is not the culprit artery. The left anterior descending coronary  artery possesses JIE III flow. There is no significant obstructive  disease identified in the LAD. There is JIE grade III flow. There is  some tortuosity.   The LAD courses around the apex of the left ventricle  and feeds a minimal portion of the inferior apex. The JR4 guide was used to engage the right coronary artery. Injection  shows that the ostium of the right coronary artery is patent. In the  proximal vessel for the first bend, there is a subtotal occlusion of the  right coronary artery. It is 99 plus percent stenosed. The right  posterolateral branch of the right coronary artery is large and  angiographically unremarkable. The right posterior descending is  somewhat small but no obstructive disease identified. There are two RV  branches that are unremarkable. We immediately went with PCI upon the right coronary artery.   wire was advanced beyond the 90% stenosis. The patient was given 1000  units of heparin and Integrilin double bolus was started per protocol. After the wire was advanced easily into the right coronary artery, a 2.5  x 12-mm balloon was placed across the stenosis and inflated to 12  atmospheres x25 seconds. There was JIE III blood flow reestablished  with residual 90% stenosis. I then took a 3.5 mm x 15 mm length Xience  Abby drug-eluting stent placed across the residual stenosis and then  inflated it to 12 atmospheres x30 seconds. There was JIE III blood  flow with 0% residual stenosis. There was no dissection or dye  extravasation. The angioplasty hardware was removed. The patient was  pain free at the conclusion of the procedure. A pigtail catheter in the left ventricle post angiography shows a left  ventricular end-diastolic pressure of 14 mmHg. Power injection in BUITRAGO  projection shows hypokinesia of most of the inferior wall from the base  to the mid portion of the inferior wall. The inferior apex is  alejandro. The LV ejection fraction is estimated at 40%. There was  no mitral regurgitation. There was about a 5-mm gradient upon pullback  across the aortic valve.     Injection of the femoral sheath showed the sheath was located squarely  in the right common femoral artery. For the patient's safety and  comfort, a 6-Greenlandic Angio-Seal device was deployed and hemostasis was  achieved. CONCLUSION:  Successful PTCA stenting of a subtotally occluded right  coronary artery. The patient was chest pain free after deployment of a  3.5 mm x 15 mm Xience Abby drug-eluting stent. No dissection or dye  extravasation was noted. There was JIE grade III flow with nearly 0%  residual stenosis. There is a 40% stenosis of obtuse marginal branch  #1. It is not the culprit vessel. There is JIE grade III flow there. The remaining coronary arteries appear to be essentially normal.  LV  ejection fraction 40%, inferior wall hypokinesia. PLAN:  Hydration, bed rest.  Risk factor modification. ICU management. Six hours Integrilin. Brilinta was given on the table 180 mg. EBL < 25 ml.         Sarina Means MD    D: 10/31/2020 3:06:26       T: 10/31/2020 5:58:11     [email or web address]  Job#: [id number]     Doc#: [id number]    CC:

## 2020-10-31 NOTE — PROGRESS NOTES
1730 - Report called to PCU RN. Pt to transfer to room 5111.     1800 - Pt transported to room 5111. Pt belongings transported with pt and placed in pt closet in room. Pt left in stable condition.      Electronically signed by Jen Michael RN on 10/31/2020 at 6:05 PM

## 2020-10-31 NOTE — PLAN OF CARE
Brief Pre-Op Note/Sedation Assessment      Mariaa Swain  1960  [id number]      [id number]  2:57 AM    Planned Procedure: Cardiac Catheterization Procedure    Post Procedure Plan: Return to same level of care    Consent: I have discussed with the patient and/or the patient representative the indication, alternatives, and the possible risks and/or complications of the planned procedure and the anesthesia methods. The patient and/or patient representative appear to understand and agree to proceed. Chief Complaint: STEMI      Indications for Cath Procedure:  ACS <= 24 hrs  Anginal Classification within 2 weeks:  CCS IV - Inability to perform any activity without angina or angina at rest, i.e., severe limitation  NYHA Heart Failure Class within 2 weeks: No symptoms  Is Cath Lab Visit Valve-related?: No  Surgical Risk: Low  Functional Type: Unknown    Anti- Anginal Meds within 2 weeks:   No: Contraindicated  none taken. no contraindication. Stress or Imaging Studies Performed (within 6 months):  None     Vital Signs:  /78   Pulse 115   Resp 23   Ht 5' 1\" (1.549 m)   Wt 189 lb 13.1 oz (86.1 kg)   SpO2 100%   BMI 35.87 kg/m²     Allergies:   Allergies   Allergen Reactions    Penicillins      Reports \"as a child and is not allergic anymore\"       Past Medical History:  Past Medical History:   Diagnosis Date    Asthma     Elevated C-reactive protein (CRP) 3/28/11    hsCRP-6.02    Hemorrhoid     Hyperlipidemia 1/5/07    dyslipidemia (HDL-47), TC-216; HDL-42 & TC-213 on 3/28/11    Hypertriglyceridemia 1/5/07    TG's- 303; TG's-304 on 3/28/11    Menopause 2006    Osteoarthritis     ankles, knees    Overweight(278.02)          Surgical History:  Past Surgical History:   Procedure Laterality Date    TUBAL LIGATION  1986         Medications:  Current Facility-Administered Medications   Medication Dose Route Frequency Provider Last Rate Last Dose    heparin 25,000 unit in sodium

## 2020-11-01 LAB
ANION GAP SERPL CALCULATED.3IONS-SCNC: 18 MMOL/L (ref 3–16)
BUN BLDV-MCNC: 15 MG/DL (ref 7–20)
CALCIUM SERPL-MCNC: 9.5 MG/DL (ref 8.3–10.6)
CHLORIDE BLD-SCNC: 95 MMOL/L (ref 99–110)
CO2: 21 MMOL/L (ref 21–32)
CREAT SERPL-MCNC: 0.8 MG/DL (ref 0.6–1.1)
GFR AFRICAN AMERICAN: >60
GFR NON-AFRICAN AMERICAN: >60
GLUCOSE BLD-MCNC: 141 MG/DL (ref 70–99)
GLUCOSE BLD-MCNC: 145 MG/DL (ref 70–99)
GLUCOSE BLD-MCNC: 145 MG/DL (ref 70–99)
GLUCOSE BLD-MCNC: 152 MG/DL (ref 70–99)
GLUCOSE BLD-MCNC: 193 MG/DL (ref 70–99)
HCT VFR BLD CALC: 43.3 % (ref 36–48)
HEMOGLOBIN: 14.7 G/DL (ref 12–16)
MCH RBC QN AUTO: 30.1 PG (ref 26–34)
MCHC RBC AUTO-ENTMCNC: 33.9 G/DL (ref 31–36)
MCV RBC AUTO: 88.7 FL (ref 80–100)
PDW BLD-RTO: 13.6 % (ref 12.4–15.4)
PERFORMED ON: ABNORMAL
PLATELET # BLD: 372 K/UL (ref 135–450)
PMV BLD AUTO: 8.3 FL (ref 5–10.5)
POTASSIUM REFLEX MAGNESIUM: 3.6 MMOL/L (ref 3.5–5.1)
RBC # BLD: 4.88 M/UL (ref 4–5.2)
SODIUM BLD-SCNC: 134 MMOL/L (ref 136–145)
WBC # BLD: 11.6 K/UL (ref 4–11)

## 2020-11-01 PROCEDURE — 93005 ELECTROCARDIOGRAM TRACING: CPT | Performed by: INTERNAL MEDICINE

## 2020-11-01 PROCEDURE — 36415 COLL VENOUS BLD VENIPUNCTURE: CPT

## 2020-11-01 PROCEDURE — 99233 SBSQ HOSP IP/OBS HIGH 50: CPT | Performed by: NURSE PRACTITIONER

## 2020-11-01 PROCEDURE — 80048 BASIC METABOLIC PNL TOTAL CA: CPT

## 2020-11-01 PROCEDURE — 2580000003 HC RX 258: Performed by: INTERNAL MEDICINE

## 2020-11-01 PROCEDURE — 6370000000 HC RX 637 (ALT 250 FOR IP): Performed by: INTERNAL MEDICINE

## 2020-11-01 PROCEDURE — 6370000000 HC RX 637 (ALT 250 FOR IP): Performed by: NURSE PRACTITIONER

## 2020-11-01 PROCEDURE — 85027 COMPLETE CBC AUTOMATED: CPT

## 2020-11-01 PROCEDURE — 94640 AIRWAY INHALATION TREATMENT: CPT

## 2020-11-01 PROCEDURE — 94760 N-INVAS EAR/PLS OXIMETRY 1: CPT

## 2020-11-01 PROCEDURE — 2060000000 HC ICU INTERMEDIATE R&B

## 2020-11-01 RX ORDER — ALBUTEROL SULFATE 90 UG/1
2 AEROSOL, METERED RESPIRATORY (INHALATION) EVERY 4 HOURS PRN
Status: DISCONTINUED | OUTPATIENT
Start: 2020-11-01 | End: 2020-11-02 | Stop reason: HOSPADM

## 2020-11-01 RX ORDER — ACETAMINOPHEN 325 MG/1
650 TABLET ORAL EVERY 6 HOURS
Status: DISCONTINUED | OUTPATIENT
Start: 2020-11-01 | End: 2020-11-02 | Stop reason: HOSPADM

## 2020-11-01 RX ORDER — ALBUTEROL SULFATE 90 UG/1
2 AEROSOL, METERED RESPIRATORY (INHALATION) 4 TIMES DAILY
Status: DISCONTINUED | OUTPATIENT
Start: 2020-11-01 | End: 2020-11-01

## 2020-11-01 RX ORDER — IPRATROPIUM BROMIDE AND ALBUTEROL SULFATE 2.5; .5 MG/3ML; MG/3ML
1 SOLUTION RESPIRATORY (INHALATION) 4 TIMES DAILY
Status: DISCONTINUED | OUTPATIENT
Start: 2020-11-01 | End: 2020-11-02 | Stop reason: HOSPADM

## 2020-11-01 RX ADMIN — BUDESONIDE AND FORMOTEROL FUMARATE DIHYDRATE 2 PUFF: 160; 4.5 AEROSOL RESPIRATORY (INHALATION) at 19:36

## 2020-11-01 RX ADMIN — ASPIRIN 81 MG: 81 TABLET, CHEWABLE ORAL at 08:54

## 2020-11-01 RX ADMIN — ATORVASTATIN CALCIUM 80 MG: 80 TABLET, FILM COATED ORAL at 20:43

## 2020-11-01 RX ADMIN — MONTELUKAST 10 MG: 10 TABLET, FILM COATED ORAL at 20:43

## 2020-11-01 RX ADMIN — TICAGRELOR 90 MG: 90 TABLET ORAL at 08:54

## 2020-11-01 RX ADMIN — IPRATROPIUM BROMIDE AND ALBUTEROL SULFATE 1 AMPULE: .5; 3 SOLUTION RESPIRATORY (INHALATION) at 16:35

## 2020-11-01 RX ADMIN — VALSARTAN 40 MG: 80 TABLET, FILM COATED ORAL at 08:55

## 2020-11-01 RX ADMIN — IPRATROPIUM BROMIDE AND ALBUTEROL SULFATE 1 AMPULE: .5; 3 SOLUTION RESPIRATORY (INHALATION) at 11:25

## 2020-11-01 RX ADMIN — FUROSEMIDE 40 MG: 40 TABLET ORAL at 08:54

## 2020-11-01 RX ADMIN — SODIUM CHLORIDE, PRESERVATIVE FREE 10 ML: 5 INJECTION INTRAVENOUS at 08:56

## 2020-11-01 RX ADMIN — INSULIN LISPRO 1 UNITS: 100 INJECTION, SOLUTION INTRAVENOUS; SUBCUTANEOUS at 20:48

## 2020-11-01 RX ADMIN — SODIUM CHLORIDE, PRESERVATIVE FREE 10 ML: 5 INJECTION INTRAVENOUS at 20:43

## 2020-11-01 RX ADMIN — BUDESONIDE AND FORMOTEROL FUMARATE DIHYDRATE 2 PUFF: 160; 4.5 AEROSOL RESPIRATORY (INHALATION) at 08:03

## 2020-11-01 RX ADMIN — IPRATROPIUM BROMIDE AND ALBUTEROL SULFATE 1 AMPULE: .5; 3 SOLUTION RESPIRATORY (INHALATION) at 19:36

## 2020-11-01 RX ADMIN — POTASSIUM CHLORIDE 10 MEQ: 750 TABLET, FILM COATED, EXTENDED RELEASE ORAL at 08:55

## 2020-11-01 RX ADMIN — METOPROLOL SUCCINATE 25 MG: 25 TABLET, EXTENDED RELEASE ORAL at 08:55

## 2020-11-01 RX ADMIN — TICAGRELOR 90 MG: 90 TABLET ORAL at 20:43

## 2020-11-01 RX ADMIN — FLUTICASONE PROPIONATE 2 SPRAY: 50 SPRAY, METERED NASAL at 08:54

## 2020-11-01 ASSESSMENT — PAIN SCALES - GENERAL
PAINLEVEL_OUTOF10: 0

## 2020-11-01 NOTE — PLAN OF CARE
Problem: Discharge Planning:  Goal: Discharged to appropriate level of care  Description: Discharged to appropriate level of care  11/1/2020 1434 by Timmy Richardson RN  Outcome: Ongoing     Problem: Pain - Acute:  Goal: Pain level will decrease  Description: Pain level will decrease  11/1/2020 1434 by Timmy Richardson RN  Outcome: Ongoing     Problem: Fluid Volume - Imbalance:  Goal: Will show no signs and symptoms of excessive bleeding  Description: Will show no signs and symptoms of excessive bleeding  11/1/2020 1434 by Timmy Richardson RN  Outcome: Ongoing     Problem: Fluid Volume - Imbalance:  Goal: Absence of imbalanced fluid volume signs and symptoms  Description: Absence of imbalanced fluid volume signs and symptoms  11/1/2020 1434 by Timmy Richardson RN  Outcome: Ongoing     Problem: Anxiety:  Goal: Level of anxiety will decrease  Description: Level of anxiety will decrease  11/1/2020 1434 by Timmy Richardson RN  Outcome: Ongoing     Problem: Cardiac Output - Decreased:  Goal: Cardiac output within specified parameters  Description: Cardiac output within specified parameters  11/1/2020 1434 by Timmy Richardson RN  Outcome: Ongoing     Problem: Serum Glucose Level - Abnormal:  Goal: Ability to maintain appropriate glucose levels will improve  Description: Ability to maintain appropriate glucose levels will improve  11/1/2020 1434 by Timmy Richardson RN  Outcome: Ongoing     Problem: Tissue Perfusion - Cardiopulmonary, Altered:  Goal: Circulatory function within specified parameters  Description: Circulatory function within specified parameters  11/1/2020 1434 by Timmy Richardson RN  Outcome: Ongoing

## 2020-11-01 NOTE — CONSULTS
Hospital Medicine  Consult History & Physical        Chief Complaint: Wheezing    Date of Service: Pt seen/examined in consultation on 11/1/2020    History Of Present Illness:      64 y.o. female who we are asked to see/evaluate by No att. providers found for medical management of elevated A1c hyperlipidemia and COPD. Was admitted for severe chest pain and was found to have an inferior STEMI and underwent stent placement. Patient has a lot of shortness of breath and wheezing. No chest pain at present. She has no documented history of diabetes or hyperlipidemia    Past Medical History:        Diagnosis Date    Asthma     Elevated C-reactive protein (CRP) 3/28/11    hsCRP-6.02    Hemorrhoid     Hyperlipidemia 1/5/07    dyslipidemia (HDL-47), TC-216; HDL-42 & TC-213 on 3/28/11    Hypertriglyceridemia 1/5/07    TG's- 303; TG's-304 on 3/28/11    Menopause 2006    Osteoarthritis     ankles, knees    Overweight(278.02)        Past Surgical History:        Procedure Laterality Date    TUBAL LIGATION  1986       Medications Prior to Admission:    Prior to Admission medications    Medication Sig Start Date End Date Taking?  Authorizing Provider   amoxicillin-clavulanate (AUGMENTIN) 875-125 MG per tablet Take 1 tablet by mouth 2 times daily 10/30/20 11/5/20 Yes Historical Provider, MD   Fluticasone furoate-vilanterol (BREO ELLIPTA) 200-25 MCG/INH AEPB inhaler Inhale 1 Inhaler into the lungs daily 4/21/20  Yes Historical Provider, MD   fluticasone (FLONASE) 50 MCG/ACT nasal spray 2 sprays by Nasal route daily 10/19/20  Yes Historical Provider, MD   furosemide (LASIX) 40 MG tablet Take 40 mg by mouth daily 10/19/20  Yes Historical Provider, MD   ipratropium-albuterol (DUONEB) 0.5-2.5 (3) MG/3ML SOLN nebulizer solution USE 3 MLS WITH NEBULIZER FOUR TIMES DAILY 4/6/20  Yes Historical Provider, MD   methocarbamol (ROBAXIN) 750 MG tablet Take 750 mg by mouth 3 times daily 9/16/20  Yes Historical Provider, MD montelukast (SINGULAIR) 10 MG tablet Take 10 mg by mouth nightly 9/15/20  Yes Historical Provider, MD   potassium chloride (KLOR-CON) 10 MEQ extended release tablet Take 10 mEq by mouth daily 9/16/20  Yes Historical Provider, MD   albuterol sulfate  (90 Base) MCG/ACT inhaler Inhale 2 puffs into the lungs 4 times daily as needed 10/19/20  Yes Historical Provider, MD   albuterol sulfate HFA (PROAIR HFA) 108 (90 Base) MCG/ACT inhaler Inhale 2 puffs into the lungs every 6 hours as needed for Wheezing 8/12/18  Yes Dominick Grove MD   predniSONE (DELTASONE) 10 MG tablet Take 10 mg by mouth daily   Yes Historical Provider, MD       Allergies:  Penicillins    Social History:          TOBACCO:   reports that she has been smoking cigarettes. She has a 37.00 pack-year smoking history. She has never used smokeless tobacco.  ETOH:   reports no history of alcohol use. Family History:           Problem Relation Age of Onset    Heart Disease Brother 21        multiple MI's (25) and pacer    Heart Disease Paternal Grandmother         pacer    Cancer Brother 18        bone       REVIEW OF SYSTEMS:   Pertinent positives as noted in the HPI. All other systems reviewed and negative. PHYSICAL EXAM PERFORMED:  /79   Pulse 91   Temp 97.5 °F (36.4 °C) (Oral)   Resp 20   Ht 5' 1\" (1.549 m)   Wt 183 lb 10.3 oz (83.3 kg)   SpO2 96%   BMI 34.70 kg/m²   General appearance: No apparent distress, appears stated age and cooperative. HEENT: Normal cephalic, atraumatic without obvious deformity. Pupils equal, round, and reactive to light. Extra ocular muscles intact. Conjunctivae/corneas clear. Neck: Supple, with full range of motion. No jugular venous distention. Trachea midline. Respiratory: Air entry okay. Bilateral wheezing and rhonchi. Cardiovascular: Regular rate and rhythm with normal S1/S2 without murmurs, rubs or gallops.   Abdomen: Soft, non-tender, non-distended with normal bowel sounds. Musculoskeletal:No clubbing, cyanosis or edema bilaterally. Skin: Skin color, texture, turgor normal.  No rashes or lesions. Neurologic:  Neurovascularly intact without any focal sensory/motor deficits. Cranial nerves: II-XII intact, grossly non-focal.  Psychiatric: Alert and oriented, thought content appropriate, normal insight  Capillary Refill: Brisk,< 3 seconds   Peripheral Pulses: +2 palpable, equal bilaterally     Labs:     Recent Labs     10/31/20  0137 11/01/20  0537   WBC 13.3* 11.6*   HGB 15.3 14.7   HCT 47.3 43.3    372     Recent Labs     10/31/20  0137 11/01/20  0537   * 134*   K 4.1 3.6   CL 96* 95*   CO2 22 21   BUN 15 15   CREATININE 0.8 0.8   CALCIUM 10.2 9.5     No results for input(s): AST, ALT, BILIDIR, BILITOT, ALKPHOS in the last 72 hours. No results for input(s): INR in the last 72 hours. Recent Labs     10/31/20  0137   TROPONINI 0.24*       Urinalysis:  No results found for: Ceola Beery, BACTERIA, RBCUA, BLOODU, SPECGRAV, GLUCOSEU    Radiology: I have reviewed the radiology reports with the following interpretation:     XR CHEST PORTABLE   Final Result   Stable chest x-ray. No acute disease. EKG:  I have reviewed the EKG with the following interpretation:    @[email or web address]@      ASSESSMENT:    Active Hospital Problems    Diagnosis Date Noted    New onset type 2 diabetes mellitus ([facility] [address] [E11.9]     Tobacco use [Z72.0]     Acute inferior myocardial infarction [facility]) [I21.19] 10/31/2020    Acute deep vein thrombosis (DVT) of inferior vena cava (HCC) [I82.220]     ST elevation myocardial infarction (STEMI) ([facility]) [I21.3]        PLAN:    STEMI   -S/p stent placement  -Continue aspirin Brilinta and statin  -Beta-blocker added    COPD  -Sees pulmonology at [facility]  -Started on duo nebs Symbicort  -Avoid steroids.   If patient's respiratory status does not improve will add    LV dysfunction  -On Diovan    New diagnosis of diabetes  -A1c 7.5  -Metformin added  -Diabetic educator consulted    Hyperlipidemia  -Continue statin    DVT Prophylaxis: Lovenox  Diet: DIET CARDIAC;  Code Status: Full Code    PT/OT Eval Status: Active and ongoing      Thank you for the consultation, will follow up .     Electronically signed by Gualberto Plascencia MD on 11/1/20 at 12:50 PM EST

## 2020-11-01 NOTE — PROGRESS NOTES
[facility] [address]   Daily Progress Note      Admit Date:  10/31/2020    Reason for follow up visit: Inferior MI    CC: \"I feel more SOB today. \"    65 y/o female with PMH significant for tobacco use, COPD and mixed dyslipidemia admitted with inferior STEMI. She presented to [facility] with c/o chest pain and ECG showed inferior ST elevation. She was taken to cath lab and underwent placement of stent to occluded RCA. While here her BS has been elevated and A1c performed which was elevated at 7.5. Interval History:  Pt. seen and examined; records reviewed  BP reviewed. A1c 7.5 (hospitalist consulted)  Increased SOB and wheezing today  Denies chest pain    Subjective:  Pt with no acute overnight cardiac events. Denies chest pain, palpitations or dizziness  + SOB worse this AM  + productive cough    Review of Systems:   · Constitutional: no unanticipated weight loss. There's been no change in energy level, sleep pattern, or activity level. No fevers, chills. · Eyes: No visual changes or diplopia. No scleral icterus. · ENT: No Headaches, hearing loss or vertigo. No mouth sores or sore throat. · Cardiovascular: as reviewed in HPI  · Respiratory: + chronic cough, SOB  · Gastrointestinal: No abdominal pain, appetite loss, blood in stools. No change in bowel or bladder habits. · Genitourinary: No dysuria, trouble voiding, or hematuria. · Musculoskeletal:  No gait disturbance, no joint complaints. · Integumentary: No rash or pruritis. · Neurological: No headache, diplopia, change in muscle strength, numbness or tingling. · Psychiatric: No anxiety or depression. · Endocrine: No temperature intolerance. No excessive thirst, fluid intake, or urination. No tremor. · Hematologic/Lymphatic: No abnormal bruising or bleeding, blood clots or swollen lymph nodes. · Allergic/Immunologic: No nasal congestion or hives.     Objective:   /72   Pulse 96   Temp 97.5 °F (36.4 °C) (Oral)   Resp 20   Ht 5' 1\" (1.549 m)   Wt 183 lb 10.3 oz (83.3 kg)   SpO2 94%   BMI 34.70 kg/m²   No intake or output data in the 24 hours ending 11/01/20 1011  Wt Readings from Last 3 Encounters:   11/01/20 183 lb 10.3 oz (83.3 kg)   08/12/18 183 lb 6.8 oz (83.2 kg)   11/16/17 181 lb 14.1 oz (82.5 kg)       Physical Exam:  General: In no acute distress. Awake, alert, and oriented X4. Increased dyspnea with exertion. Skin:  Warm and dry. No new appearing rashes or lesions. Neck:  Supple. No JVD or carotid bruit appreciated. Chest: Lungs with expiratory wheezes  Cardiovascular:  RRR. Normal S1 and S 2. No murmur/gallop or rub  Abdomen:  soft, nontender, nondistended, +bowel sounds. No hepatomegaly  Extremities:  No LE edema. No clubbing or cyanosis. 2+ bilateral radial/DP/PT pulses. Cap refill brisk. Medications:    sodium chloride flush  10 mL Intravenous 2 times per day    metoprolol succinate  25 mg Oral Daily    aspirin  81 mg Oral Daily    ticagrelor  90 mg Oral BID    insulin lispro  0-6 Units Subcutaneous TID WC    insulin lispro  0-3 Units Subcutaneous Nightly    atorvastatin  80 mg Oral Nightly    fluticasone  2 spray Nasal Daily    furosemide  40 mg Oral Daily    montelukast  10 mg Oral Nightly    potassium chloride  10 mEq Oral Daily    valsartan  40 mg Oral Daily    budesonide-formoterol  2 puff Inhalation BID       sodium chloride flush, acetaminophen, ondansetron, albuterol sulfate HFA, ipratropium-albuterol    Lab Data:  CBC:   Recent Labs     10/31/20  0137 11/01/20  0537   WBC 13.3* 11.6*   HGB 15.3 14.7    372     BMP:    Recent Labs     10/31/20  0137 11/01/20  0537   * 134*   K 4.1 3.6   CO2 22 21   BUN 15 15   CREATININE 0.8 0.8     LIVR: No results for input(s): AST, ALT in the last 72 hours. INR:  No results for input(s): INR in the last 72 hours. APTT:   Recent Labs     10/31/20  0137   APTT 32.9     Results for Rhianna Tapiaaine (MRN [id number]) as of 11/1/2020 11:19   Ref.  Range 10/31/2020 01:37   Pro-BNP Latest Ref Range: 0 - 124 pg/mL 2,205 (H)   Troponin Latest Ref Range: <0.01 ng/mL 0.24 (H)     Results for Yolande Amis (MRN [id number]) as of 11/1/2020 11:19   Ref. Range 10/31/2020 04:40   Cholesterol, Total Latest Ref Range: 0 - 199 mg/dL 278 (H)   HDL Cholesterol Latest Ref Range: 40 - 60 mg/dL 38 (L)   LDL Calculated Latest Ref Range: <100 mg/dL see below   LDL Direct Latest Ref Range: <100 mg/dL 162 (H)   Triglycerides Latest Ref Range: 0 - 150 mg/dL 605 (H)   VLDL Cholesterol Calculated Latest Ref Range: Not Established mg/dL see below     CXR: No acute disease    ECG 11/1/2020:  SR with inferior infarct    Echo 11/1/2020:  Normal left ventricular size and wall thickness. There is low normal to mildly reduced systolic function with basal to mid   inferior wall hypokinesis. Ejection fraction is visually estimated to be 45-50%. Grade I diastolic dysfunction with normal LV filling pressures. The right ventricle is normal in size. Right ventricular systolic function is mildly reduced. There are no significant valvular abnormalities appreciated in this study. Cardiac cath 10/31/2020:  LM normal  LCX: 50% OM1  LAD: patent  RCA: subtotal occlusion; EMBER placed (Xience 3.5 x 15 mm)  LVEF 40%; inferior HK     Telemetry: SR - sinus tachycardia    Assessment/Plan:    1. Acute inferior STEMI  -S/P EMBER to RCA  -ischemic cardiomyopathy with LVEF 40% on cath; EF 45-50% on follow up echo  -angina quiet  -continue ASA, Brilinta, statin, Toprol and Valsartan  -risk factor modification     2. Mixed hyperlipidemia  -     -high intensity statin added  -needs BS control     3. Diabetes Mellitus  -new diagnosis  -A1c 7.5  -will ask hospitalist for assistance  -advised she needs follow up with her PCP as outpt     4. Tobacco use  -emphasized smoking cessation     5.  COPD  -follows pulmonary at [facility]  -inhalers and nebulizer Treatment      Lengthy discussion with pt and

## 2020-11-02 VITALS
BODY MASS INDEX: 34.88 KG/M2 | TEMPERATURE: 97.8 F | HEART RATE: 96 BPM | HEIGHT: 61 IN | SYSTOLIC BLOOD PRESSURE: 95 MMHG | RESPIRATION RATE: 16 BRPM | WEIGHT: 184.75 LBS | DIASTOLIC BLOOD PRESSURE: 62 MMHG | OXYGEN SATURATION: 98 %

## 2020-11-02 LAB
EKG ATRIAL RATE: 88 BPM
EKG DIAGNOSIS: NORMAL
EKG P AXIS: 80 DEGREES
EKG P-R INTERVAL: 140 MS
EKG Q-T INTERVAL: 318 MS
EKG QRS DURATION: 86 MS
EKG QTC CALCULATION (BAZETT): 384 MS
EKG R AXIS: 24 DEGREES
EKG T AXIS: 89 DEGREES
EKG VENTRICULAR RATE: 88 BPM
GLUCOSE BLD-MCNC: 123 MG/DL (ref 70–99)
GLUCOSE BLD-MCNC: 164 MG/DL (ref 70–99)
HCT VFR BLD CALC: 43.1 % (ref 36–48)
HEMOGLOBIN: 14.8 G/DL (ref 12–16)
MCH RBC QN AUTO: 30.7 PG (ref 26–34)
MCHC RBC AUTO-ENTMCNC: 34.4 G/DL (ref 31–36)
MCV RBC AUTO: 89.2 FL (ref 80–100)
PDW BLD-RTO: 13.6 % (ref 12.4–15.4)
PERFORMED ON: ABNORMAL
PERFORMED ON: ABNORMAL
PLATELET # BLD: 353 K/UL (ref 135–450)
PMV BLD AUTO: 8.4 FL (ref 5–10.5)
RBC # BLD: 4.83 M/UL (ref 4–5.2)
WBC # BLD: 10.5 K/UL (ref 4–11)

## 2020-11-02 PROCEDURE — 94640 AIRWAY INHALATION TREATMENT: CPT

## 2020-11-02 PROCEDURE — 99238 HOSP IP/OBS DSCHRG MGMT 30/<: CPT | Performed by: INTERNAL MEDICINE

## 2020-11-02 PROCEDURE — 93010 ELECTROCARDIOGRAM REPORT: CPT | Performed by: INTERNAL MEDICINE

## 2020-11-02 PROCEDURE — 85027 COMPLETE CBC AUTOMATED: CPT

## 2020-11-02 PROCEDURE — 6370000000 HC RX 637 (ALT 250 FOR IP): Performed by: INTERNAL MEDICINE

## 2020-11-02 PROCEDURE — 36415 COLL VENOUS BLD VENIPUNCTURE: CPT

## 2020-11-02 PROCEDURE — 2580000003 HC RX 258: Performed by: INTERNAL MEDICINE

## 2020-11-02 PROCEDURE — 94760 N-INVAS EAR/PLS OXIMETRY 1: CPT

## 2020-11-02 PROCEDURE — 6370000000 HC RX 637 (ALT 250 FOR IP): Performed by: NURSE PRACTITIONER

## 2020-11-02 RX ORDER — METOPROLOL SUCCINATE 25 MG/1
25 TABLET, EXTENDED RELEASE ORAL DAILY
Qty: 30 TABLET | Refills: 3 | Status: SHIPPED | OUTPATIENT
Start: 2020-11-03 | End: 2020-11-23 | Stop reason: SDUPTHER

## 2020-11-02 RX ORDER — ATORVASTATIN CALCIUM 80 MG/1
80 TABLET, FILM COATED ORAL NIGHTLY
Qty: 30 TABLET | Refills: 3 | Status: SHIPPED | OUTPATIENT
Start: 2020-11-02 | End: 2020-11-23 | Stop reason: SDUPTHER

## 2020-11-02 RX ORDER — CALCIUM CITRATE/VITAMIN D3 200MG-6.25
1 TABLET ORAL 3 TIMES DAILY
Qty: 100 EACH | Refills: 3 | Status: SHIPPED | OUTPATIENT
Start: 2020-11-02

## 2020-11-02 RX ORDER — LANCETS 30 GAUGE
1 EACH MISCELLANEOUS 3 TIMES DAILY
Qty: 600 EACH | Refills: 1 | Status: SHIPPED | OUTPATIENT
Start: 2020-11-02

## 2020-11-02 RX ORDER — FUROSEMIDE 40 MG/1
40 TABLET ORAL DAILY
Qty: 30 TABLET | Refills: 3 | Status: SHIPPED | OUTPATIENT
Start: 2020-11-02 | End: 2020-11-17

## 2020-11-02 RX ORDER — LANCING DEVICE
EACH MISCELLANEOUS
Qty: 1 EACH | Refills: 0 | Status: SHIPPED | OUTPATIENT
Start: 2020-11-02

## 2020-11-02 RX ORDER — ASPIRIN 81 MG/1
81 TABLET, CHEWABLE ORAL DAILY
Qty: 30 TABLET | Refills: 3 | Status: SHIPPED | OUTPATIENT
Start: 2020-11-03

## 2020-11-02 RX ORDER — VALSARTAN 40 MG/1
40 TABLET ORAL DAILY
Qty: 30 TABLET | Refills: 3 | Status: SHIPPED | OUTPATIENT
Start: 2020-11-03 | End: 2020-11-30 | Stop reason: SDUPTHER

## 2020-11-02 RX ADMIN — FUROSEMIDE 40 MG: 40 TABLET ORAL at 08:44

## 2020-11-02 RX ADMIN — METFORMIN HYDROCHLORIDE 500 MG: 500 TABLET ORAL at 08:45

## 2020-11-02 RX ADMIN — ASPIRIN 81 MG: 81 TABLET, CHEWABLE ORAL at 08:44

## 2020-11-02 RX ADMIN — POTASSIUM CHLORIDE 10 MEQ: 750 TABLET, FILM COATED, EXTENDED RELEASE ORAL at 08:45

## 2020-11-02 RX ADMIN — BUDESONIDE AND FORMOTEROL FUMARATE DIHYDRATE 2 PUFF: 160; 4.5 AEROSOL RESPIRATORY (INHALATION) at 07:58

## 2020-11-02 RX ADMIN — SODIUM CHLORIDE, PRESERVATIVE FREE 10 ML: 5 INJECTION INTRAVENOUS at 08:48

## 2020-11-02 RX ADMIN — IPRATROPIUM BROMIDE AND ALBUTEROL SULFATE 1 AMPULE: .5; 3 SOLUTION RESPIRATORY (INHALATION) at 11:42

## 2020-11-02 RX ADMIN — INSULIN LISPRO 1 UNITS: 100 INJECTION, SOLUTION INTRAVENOUS; SUBCUTANEOUS at 08:48

## 2020-11-02 RX ADMIN — TICAGRELOR 90 MG: 90 TABLET ORAL at 08:45

## 2020-11-02 RX ADMIN — METOPROLOL SUCCINATE 25 MG: 25 TABLET, EXTENDED RELEASE ORAL at 08:45

## 2020-11-02 RX ADMIN — VALSARTAN 40 MG: 80 TABLET, FILM COATED ORAL at 08:44

## 2020-11-02 RX ADMIN — IPRATROPIUM BROMIDE AND ALBUTEROL SULFATE 1 AMPULE: .5; 3 SOLUTION RESPIRATORY (INHALATION) at 07:48

## 2020-11-02 ASSESSMENT — PAIN SCALES - GENERAL
PAINLEVEL_OUTOF10: 0

## 2020-11-02 NOTE — DISCHARGE SUMMARY
[address] [place] [address] SUMMARY      Patient ID:  Citlali Khan  [id number] 64 y.o. 1960    Admit date: 10/31/2020    Discharge date:  11/2/2020    Admitting Physician: Matt An MD     Discharge MD: Malia Vidal     Admission Diagnoses: Acute inferior myocardial infarction [facility]) [I21.19]    Discharge Diagnoses:   Patient Active Problem List   Diagnosis    Patient overweight    Hemorrhoid    Osteoarthritis    Hyperlipidemia    Menopause    Vitamin D deficiency    Acute inferior myocardial infarction ([facility] [address]    Acute deep vein thrombosis (DVT) of inferior vena cava (HCC)    ST elevation myocardial infarction (STEMI) ([facility] [address]    New onset type 2 diabetes mellitus ([facility]    Tobacco use        Discharged Condition: good    Hospital Course: Citlali Khan was admitted STEMI      At discharge,     Consults: IP CONSULT TO CARDIOLOGY  IP CONSULT TO CARDIAC REHAB  IP CONSULT TO HOSPITALIST  IP CONSULT TO DIABETES EDUCATOR    The patient was seen and examined. Notes reviewed. There were not complications over night. The patient is being seen for ischemic heart disease.     Objective:     BP 95/62   Pulse 96   Temp 97.8 °F (36.6 °C) (Oral)   Resp 16   Ht 5' 1\" (1.549 m)   Wt 184 lb 11.9 oz (83.8 kg)   SpO2 98%   BMI 34.91 kg/m²      Intake/Output Summary (Last 24 hours) at 11/2/2020 1428  Last data filed at 11/2/2020 1343  Gross per 24 hour   Intake 480 ml   Output 800 ml   Net -320 ml       Physical Exam:  General:  Awake, alert, NAD  Skin:  Warm and dry  Neck:  JVD<8, no bruit  Chest:  Clear to auscultation, no wheezes/rhonchi/rales  Cardiovascular:  RRR S1S2  Abdomen:  Soft, nontender, +bowel sounds  Extremities:  no edema    Significant Diagnostic Studies:     Echocardiography:     Stress test:     Labs:   Lab Results   Component Value Date    CREATININE 0.8 11/01/2020    BUN 15 11/01/2020     (L) 11/01/2020    K 3.6 11/01/2020    CL 95 (L) 11/01/2020    CO2 21 daily             metoprolol succinate (TOPROL XL) 25 MG extended release tablet  Take 1 tablet by mouth daily             montelukast (SINGULAIR) 10 MG tablet  Take 10 mg by mouth nightly             predniSONE (DELTASONE) 10 MG tablet  Take 10 mg by mouth daily             ticagrelor (BRILINTA) 90 MG TABS tablet  Take 1 tablet by mouth 2 times daily             valsartan (DIOVAN) 40 MG tablet  Take 1 tablet by mouth daily                 1. Overall the patient is stable from CV standpoint    2. Most recent cardiac testing has been reviewed as above. Further testing is not required at this time. 3. The patient is not currently smoking. The risks related to smoking were reviewed with the patient. Recommend maintaining a smoke-free lifestyle. Products available for smoking cessation were discussed in detail. 4. Dual Antiplatelet therapy has been recommended / prescribed for this patient. Education conducted on adverse reactions including bleeding was discussed. The patient verbalizes understanding not to stop medications without discussing with us. 6. Cardiac diet and physical activity discussed    Follow-up with [facility] in 2 weeks. Recommend follow up with PCP in 3-4 weeks. Signed:   Oliver Bates MD, MPH  Time spent on discharge of patient: 35 minutes

## 2020-11-02 NOTE — CARE COORDINATION
Units Subcutaneous TID WC    insulin lispro  0-3 Units Subcutaneous Nightly    atorvastatin  80 mg Oral Nightly    fluticasone  2 spray Nasal Daily    furosemide  40 mg Oral Daily    montelukast  10 mg Oral Nightly    potassium chloride  10 mEq Oral Daily    valsartan  40 mg Oral Daily    budesonide-formoterol  2 puff Inhalation BID       Objective        Patient Active Problem List   Diagnosis Code    Patient overweight E66.3    Hemorrhoid K64.9    Osteoarthritis M19.90    Hyperlipidemia E78.5    Menopause Z78.0    Vitamin D deficiency E55.9    Acute inferior myocardial infarction ([facility] [address] I21.19    Acute deep vein thrombosis (DVT) of inferior vena cava ([facility]) I82.220    ST elevation myocardial infarction (STEMI) ([facility]) I21.3    New onset type 2 diabetes mellitus ([facility]) E11.9    Tobacco use Z72.0        BP 95/62   Pulse 96   Temp 97.8 °F (36.6 °C) (Oral)   Resp 16   Ht 5' 1\" (1.549 m)   Wt 184 lb 11.9 oz (83.8 kg)   SpO2 98%   BMI 34.91 kg/m²     HgBA1c:    Lab Results   Component Value Date    LABA1C 7.5 10/31/2020       Recent Labs     11/01/20  1628 11/01/20  2045 11/02/20  0823 11/02/20  1148   POCGLU 141* 193* 164* 123*       BUN/Creatinine:    Lab Results   Component Value Date    BUN 15 11/01/2020    CREATININE 0.8 11/01/2020       Assessment        Diabetes Management and Education    Does the patient have a Primary Care Physician? Yes, LUCIO Corcoran       Does the patient require new medication instruction? Yes  Reviewed action, dose timing and side effects of Metformin. Level of patient/caregiver understanding able to:       [x] Verbalized Understanding   [] Demonstrated Understanding       [] Teach Back       [] Needs Reinforcement     []  Other:        Does the patient/caregiver monitor Blood Glucoses? No:   Reviewed glycemic control targets, testing frequency and when to call PCP.      Level of patient/caregiver understanding able to:        [x] Verbalized Understanding [] Demonstrated Understanding       [] Teach Back       [] Needs Reinforcement     []  Other:        Does the patient/caregiver follow a Meal Plan? No: Drinks coffee with sugar, Mountain Dew, sweet tea and Red Bull. Recommend making water, unsweetened tea or coffee primary drinks. Reviewed importance of eating three meals per day and plate method for consistent carb intake. Level of patient/caregiver understanding able to:       [x] Verbalized Understanding   [] Demonstrated Understanding       [] Teach Back       [] Needs Reinforcement     [x]  Other:   Willing to try [employer] [facility] and water    Does the patient/caregiver understand S/S of Hypoglycemia? No:   Reviewed symptoms, prevention and treatment. Level of patient/caregiver understanding able to:        [x] Verbalized Understanding   [] Demonstrated Understanding       [] Teach Back       [] Needs Reinforcement     []  Other:                    Does the patient/caregiver understand S/S of Hyperglycemia? No:     Reviewed symptoms, prevention and treatment. Level of patient/caregiver understanding able to:        [x] Verbalized Understanding   [] Demonstrated Understanding       [] Teach Back       [] Needs Reinforcement     []  Other:           Plan        Ongoing diabetes education and blood glucose monitoring.       The following educational and support materials were provided:  · My contact information      · The Diabetes Education Program:  Planning Healthy Meals   · Academy of Nutrition and Dietetics handout - Carbohydrate Counting for People with Diabetes  · Blood Glucose Log Book                                           Discharge Plan:  Discharge needs: glucometer/strips/lancets  Placement for patient upon discharge: independent living        Teaching Time Diabetes Education:  40 minutes     Electronically signed by Roxana Everett on 11/2/2020 at 12:12 PM

## 2020-11-02 NOTE — PROGRESS NOTES
Progress Note  Admit Date: 10/31/2020      PCP: Wai Holland     CC: F/U for COPD, diabetes    Days in hospital:  2    SUBJECTIVE / Interval History:  Patient feels okay. Wheezing has improved        Allergies  Penicillins    Medications    Scheduled Meds:   ipratropium-albuterol  1 ampule Inhalation 4x daily    acetaminophen  650 mg Oral Q6H    metFORMIN  500 mg Oral BID WC    sodium chloride flush  10 mL Intravenous 2 times per day    metoprolol succinate  25 mg Oral Daily    aspirin  81 mg Oral Daily    ticagrelor  90 mg Oral BID    insulin lispro  0-6 Units Subcutaneous TID WC    insulin lispro  0-3 Units Subcutaneous Nightly    atorvastatin  80 mg Oral Nightly    fluticasone  2 spray Nasal Daily    furosemide  40 mg Oral Daily    montelukast  10 mg Oral Nightly    potassium chloride  10 mEq Oral Daily    valsartan  40 mg Oral Daily    budesonide-formoterol  2 puff Inhalation BID     Continuous Infusions:    PRN Meds:  albuterol sulfate HFA, sodium chloride flush, ondansetron    Vitals    /70   Pulse 93   Temp 97.7 °F (36.5 °C) (Oral)   Resp 15   Ht 5' 1\" (1.549 m)   Wt 184 lb 11.9 oz (83.8 kg)   SpO2 94%   BMI 34.91 kg/m²     Exam:    Gen: No distress. Eyes: PERRL. No sclera icterus. No conjunctival injection. ENT: No discharge. Pharynx clear. External appearance of ears and nose normal.  Neck: Trachea midline. No obvious mass. Resp: No accessory muscle use. No crackles. + wheezes. No rhonchi. No dullness on percussion. CV: Regular rate. Regular rhythm. No murmur or rub. No edema. GI: Non-tender. Non-distended. No hernia. Skin: Warm, dry, normal texture and turgor. No nodule on exposed extremities. Lymph: No cervical LAD. No supraclavicular LAD. M/S: No cyanosis. No clubbing. No joint deformity. Neuro: Moves all four extremities. CN 2-12 tested, no defect noted. Psych: Oriented x 3. No anxiety. Awake. Alert. Intact judgement and insight.     Data LABS  CBC:   Recent Labs     10/31/20  0137 11/01/20  0537 11/02/20  0542   WBC 13.3* 11.6* 10.5   HGB 15.3 14.7 14.8   HCT 47.3 43.3 43.1   MCV 90.3 88.7 89.2    372 353     BMP:   Recent Labs     10/31/20  0137 11/01/20  0537   * 134*   K 4.1 3.6   CL 96* 95*   CO2 22 21   BUN 15 15   CREATININE 0.8 0.8   GLUCOSE 221* 145*     POC GLUCOSE:    Recent Labs     11/01/20  0739 11/01/20  1137 11/01/20  1628 11/01/20  2045 11/02/20  0823   POCGLU 152* 145* 141* 193* 164*     LIVER PROFILE: No results for input(s): AST, ALT, LIPASE, AMYLASE, LABALBU, BILIDIR, BILITOT, ALKPHOS in the last 72 hours. PT/INR: No results for input(s): PROTIME, INR in the last 72 hours. APTT:   Recent Labs     10/31/20  0137   APTT 32.9     UA:No results for input(s): NITRITE, COLORU, PHUR, LABCAST, WBCUA, RBCUA, MUCUS, TRICHOMONAS, YEAST, BACTERIA, CLARITYU, SPECGRAV, LEUKOCYTESUR, UROBILINOGEN, BILIRUBINUR, BLOODU, GLUCOSEU, KETUA, AMORPHOUS in the last 72 hours. Microbiology:  Wound Culture: No results for input(s): WNDABS, ORG in the last 72 hours. Invalid input(s):  LABGRAM  Nasal Culture: No results for input(s): ORG, MRSAPCR in the last 72 hours. Blood Culture: No results for input(s): BC, BLOODCULT2 in the last 72 hours. Fungal Culture:   No results for input(s): FUNGSM in the last 72 hours. No results for input(s): FUNCXBLD in the last 72 hours. CSF Culture:  No results for input(s): COLORCSF, APPEARCSF, CFTUBE, CLOTCSF, WBCCSF, RBCCSF, NEUTCSF, NUMCELLSCSF, LYMPHSCSF, MONOCSF, GLUCCSF, VOLCSF in the last 72 hours. Respiratory Culture:  No results for input(s): Patrick Beverly in the last 72 hours. AFB:No results for input(s): AFBSMEAR in the last 72 hours. Urine Culture  No results for input(s): LABURIN in the last 72 hours. RADIOLOGY:    XR CHEST PORTABLE   Final Result   Stable chest x-ray. No acute disease.              CONSULTS:    IP CONSULT TO CARDIOLOGY  IP CONSULT TO CARDIAC REHAB  IP CONSULT TO HOSPITALIST  IP CONSULT TO DIABETES EDUCATOR    ASSESSMENT AND PLAN:      Active Problems:    Acute inferior myocardial infarction ([facility] [address]    Acute deep vein thrombosis (DVT) of inferior vena cava (HCC)    ST elevation myocardial infarction (STEMI) ([facility] [address]    New onset type 2 diabetes mellitus ([facility] [address]    Tobacco use  Resolved Problems:    * No resolved hospital problems. *      STEMI   -S/p stent placement  -Continue aspirin Brilinta and statin  -Beta-blocker added     COPD- improving   -Sees pulmonology at [facility]  -Started on duo nebs Symbicort  -Avoid steroids. If patient's respiratory status does not improve will add     LV dysfunction  -On Diovan     New diagnosis of diabetes- sugars controlled  -A1c 7.5  -Metformin added  -Diabetic educator consulted     Hyperlipidemia  -Continue statin      Diet: DIET CARDIAC; Carb Control: 5 carb choices (75 gms)/meal  Code Status: Full Code      Discharge plan - per primary    The patient and / or the family were informed of the results of any tests, a time was given to answer questions, a plan was proposed and they agreed with plan. Discussed with consulting physicians, nursing and social work     The note was completed using EMR. Every effort was made to ensure accuracy; however, inadvertent computerized transcription errors may be present.        Jon Chun MD

## 2020-11-02 NOTE — PLAN OF CARE
Problem: Fluid Volume - Imbalance:  Goal: Will show no signs and symptoms of excessive bleeding  Description: Will show no signs and symptoms of excessive bleeding  11/1/2020 2241 by Kalpana Merlos RN  Outcome: Ongoing  11/1/2020 1434 by Edenilson Dyer RN  Outcome: Ongoing     Problem: Anxiety:  Goal: Level of anxiety will decrease  Description: Level of anxiety will decrease  11/1/2020 2241 by Kalpana Merlos RN  Outcome: Ongoing  11/1/2020 1434 by Edenilson Dyer RN  Outcome: Ongoing     Problem: Cardiac Output - Decreased:  Goal: Cardiac output within specified parameters  Description: Cardiac output within specified parameters  11/1/2020 2241 by Kalpana Merlos RN  Outcome: Ongoing  11/1/2020 1434 by Edenilson Dyer RN  Outcome: Ongoing     Problem: Serum Glucose Level - Abnormal:  Goal: Ability to maintain appropriate glucose levels will improve  Description: Ability to maintain appropriate glucose levels will improve  11/1/2020 2241 by Kalpana Merlos RN  Outcome: Ongoing  11/1/2020 1434 by Edenilson Dyer RN  Outcome: Ongoing     Problem: Tissue Perfusion - Cardiopulmonary, Altered:  Goal: Circulatory function within specified parameters  Description: Circulatory function within specified parameters  11/1/2020 2241 by Kalpana Merlos RN  Outcome: Ongoing  11/1/2020 1434 by Edenilson Dyer RN  Outcome: Ongoing

## 2020-11-02 NOTE — PROGRESS NOTES
Right groin site assessed. Site soft, no bruising noted. Patient has no complaints of pain.     [place] [facility] RN

## 2020-11-02 NOTE — CONSULTS
Temp 97.8 °F (36.6 °C) (Oral)   Resp 16   Ht 5' 1\" (1.549 m)   Wt 184 lb 11.9 oz (83.8 kg)   SpO2 98%   BMI 34.91 kg/m²     ADMIT:  Weight: 189 lb 13.1 oz (86.1 kg)    TODAY: Weight: 184 lb 11.9 oz (83.8 kg)    Wt Readings from Last 3 Encounters:   11/02/20 184 lb 11.9 oz (83.8 kg)   08/12/18 183 lb 6.8 oz (83.2 kg)   11/16/17 181 lb 14.1 oz (82.5 kg)        ECHOCARDIOGRAM:    HgBA1c:  No components found for: HGBA1C  LIPID PANEL:    Lab Results   Component Value Date    CHOL 278 10/31/2020    HDL 38 10/31/2020    HDL 42 03/28/2011    TRIG 605 10/31/2020        Assessment:     CONSULTS:   IP CONSULT TO CARDIOLOGY  IP CONSULT TO CARDIAC REHAB  IP CONSULT TO HOSPITALIST  IP CONSULT TO DIABETES EDUCATOR    Patient has a CARDIOLOGY CONSULT: Yes       EDUCATION STATUS: Patient  [x]  Provided both written and verbal education on Cardiopulmonary Rehabilitation. []  Provided instructions for smoking cessation programs. [x]  Provided education of CAD risk factors. []  Provided recommendations on activity and exercise. []  Provided education on medications. []  Provided education on coronary anatomy and coronary interventions. [x]  Other:Outpatient Cardiac Rehab  CURRENT DIET: DIET CARDIAC; Carb Control: 5 carb choices (75 gms)/meal    EDUCATIONAL PACKETS PROVIDED- PRINTED FROM [employer].     Titles and material given: Yes  [x]  Managing Heart Disease and Preventing Stroke  []  Heart Owner's Manual  []  Living Well with Heart Failure  []  Other:     PATIENT/CAREGIVER TEACHING:  Level of patient/caregiver understanding able to:   [x] Verbalize understanding   [] Demonstrate understanding       [] Teach back        [] Needs reinforcement     []  Other:       TEACHING TIME:  15 minutes       Plan:       DISCHARGE PLAN:  Placement for patient upon discharge: Home  Hospice Care: No  Code Status: Full Code  Discharge appointment scheduled: Not yet    RECOMMENDATIONS:   []  Patient/Family instructed to call Cardiopulmonary Rehab ([phone]) upon discharge schedule the initial evaluation  []  Encourage to call Cardiopulmonary Rehabilitation with any questions. []  Referral to alternate Cardiopulmonary Rehabilitation facility.    [x]  Other: will call after her doctor's appointment   [] Appointment scheduled for  [x] Chooses to not schedule at this time          Electronically signed by Gordy Cortes RN,MS on 11/2/2020 at 1:51 PM

## 2020-11-02 NOTE — PROGRESS NOTES
IV and telemetry monitor removed. Discharge paperwork completed and discussed with the patient and . All questions answered. Patient has been made aware of follow up appointments that have been made/need to be made and patient verbalized understanding. Patient has been given all paper prescriptions that need to be filled or the medications have been filled with [facility] and patient participated in [address] to [facility]. All belongings have been packed up and sent with the patient. Patient will be driven home by .

## 2020-11-11 NOTE — PROGRESS NOTES
[address]  Cardiology Consult    Geovanna Reynolds  1960 November 17, 2020      Reason for Referral: Coronary artery disease    CC: 'I am doing good. \"      Subjective:     History of Present Illness:    Geovanna Reynolds is a 64 y.o. patient with a PMH significant for tobacco use, COPD, and hyperlipidemia. She presented to [facility] emergency room on 10/31/2020 with complaints of chest pain and ECG showed inferior ST elevation. She was taken to the cath lab and underwent placement of stent to occluded RCA  Today, she is here for follow up s/p PCI. She is doing well since discharge. She does not exercise on a regular basis. Patient denies exertional chest pain/pressure, dyspnea at rest, AVILES, PND, orthopnea, palpitations, lightheadedness, weight changes, changes in LE edema, and syncope. Patient reports compliance to her medications. Past Medical History:   has a past medical history of Asthma, Elevated C-reactive protein (CRP), Hemorrhoid, Hyperlipidemia, Hypertriglyceridemia, Menopause, Osteoarthritis, and Overweight(278.02). Surgical History:   has a past surgical history that includes Tubal ligation (1484) and Diagnostic Cardiac Cath Lab Procedure. Social History:   reports that she has quit smoking. Her smoking use included cigarettes. She has a 37.00 pack-year smoking history. She has never used smokeless tobacco. She reports that she does not drink alcohol or use drugs. Family History:  family history includes Cancer (age of onset: 25) in her brother; Heart Disease in her paternal grandmother; Heart Disease (age of onset: 21) in her brother. Home Medications:  Were reviewed and are listed in nursing record and/or below  Prior to Admission medications    Medication Sig Start Date End Date Taking?  Authorizing Provider   ticagrelor (BRILINTA) 90 MG TABS tablet Take 1 tablet by mouth 2 times daily 11/2/20  Yes Kunal Carrasco MD   metFORMIN (GLUCOPHAGE) 500 MG tablet Take 1 tablet by mouth 2 times daily (with meals) 11/2/20  Yes Juliana Mathews MD   Blood Glucose Monitoring Suppl (TRUE METRIX METER) w/Device KIT Test blood sugars 3 times a day before meals 11/2/20  Yes Juliana Mathews MD   blood glucose test strips (TRUE METRIX BLOOD GLUCOSE TEST) strip 1 each by In Vitro route 3 times daily As needed. 11/2/20  Yes Juliana Mathews MD   Lancet Devices (LANCING DEVICE) MISC Test blood sugars 3 times a day before meals 11/2/20  Yes Juliana Mathews MD   Lancets MISC 1 each by Does not apply route 3 times daily 11/2/20  Yes Juliana Mathews MD   aspirin 81 MG chewable tablet Take 1 tablet by mouth daily 11/3/20  Yes Demetrius Perales MD   metoprolol succinate (TOPROL XL) 25 MG extended release tablet Take 1 tablet by mouth daily 11/3/20  Yes Demetrius Perales MD   valsartan (DIOVAN) 40 MG tablet Take 1 tablet by mouth daily 11/3/20  Yes Demetrius Perales MD   atorvastatin (LIPITOR) 80 MG tablet Take 1 tablet by mouth nightly 11/2/20  Yes Demetrius Perales MD   Fluticasone furoate-vilanterol (BREO ELLIPTA) 200-25 MCG/INH AEPB inhaler Inhale 1 Inhaler into the lungs daily 4/21/20  Yes Historical Provider, MD   fluticasone (FLONASE) 50 MCG/ACT nasal spray 2 sprays by Nasal route daily 10/19/20  Yes Historical Provider, MD   ipratropium-albuterol (DUONEB) 0.5-2.5 (3) MG/3ML SOLN nebulizer solution USE 3 MLS WITH NEBULIZER FOUR TIMES DAILY 4/6/20  Yes Historical Provider, MD   methocarbamol (ROBAXIN) 750 MG tablet Take 750 mg by mouth 3 times daily 9/16/20  Yes Historical Provider, MD   montelukast (SINGULAIR) 10 MG tablet Take 10 mg by mouth nightly 9/15/20  Yes Historical Provider, MD   albuterol sulfate HFA (PROAIR HFA) 108 (90 Base) MCG/ACT inhaler Inhale 2 puffs into the lungs every 6 hours as needed for Wheezing 8/12/18  Yes Denise Germain MD        CURRENT Medications:  No current facility-administered medications for this visit.        Allergies:  Penicillins           Review of Systems: SEE HPI bilaterally, respirations unlabored. No wheeze, rales   Chest Wall:  No tenderness or deformity. Cardiovascular:    Pulses  Palpation: normal   Ascultation: Regular rate, S1/ S2 normal. No murmur, rub, or gallop. 2+ radial and pedal pulses, symmetric  Carotid  Femoral   Abdomen and Gastrointestinal:   Soft, non-tender, bowel sounds active. Liver and Spleen  Masses   Musculoskeletal: No muscle wasting  Back  Gait   Extremities: Extremities normal, atraumatic. No cyanosis or edema. No cyanosis clubbing       Skin: Inspection and palpation performed, no rashes or lesions. Pysch: Normal mood and affect. Alert and oriented to time place person   Neurologic: Normal gross motor and sensory exam.       Labs     All labs have been reviewed    Lab Results   Component Value Date    WBC 10.5 11/02/2020    RBC 4.83 11/02/2020    HGB 14.8 11/02/2020    HCT 43.1 11/02/2020    MCV 89.2 11/02/2020    RDW 13.6 11/02/2020     11/02/2020     Lab Results   Component Value Date     11/01/2020    K 3.6 11/01/2020    CL 95 11/01/2020    CO2 21 11/01/2020    BUN 15 11/01/2020    CREATININE 0.8 11/01/2020    GFRAA >60 11/01/2020    GFRAA >60 03/28/2011    AGRATIO 1.3 08/12/2018    LABGLOM >60 11/01/2020    GLUCOSE 145 11/01/2020    PROT 7.2 08/12/2018    PROT 7.0 12/01/2010    CALCIUM 9.5 11/01/2020    BILITOT 0.3 08/12/2018    ALKPHOS 79 08/12/2018    AST 16 08/12/2018    ALT 20 08/12/2018     No results found for: PTINR  Lab Results   Component Value Date    LABA1C 7.5 10/31/2020     Lab Results   Component Value Date    TROPONINI 0.24 (H) 10/31/2020       Cardiac, Vascular and Imaging Data all Personally Reviewed in Detail by Myself      EKG:     Echocardiogram:   ECHO 10/31/2020  Normal left ventricular size and wall thickness. There is low normal to mildly reduced systolic function with basal to mid  inferior wall hypokinesis. Ejection fraction is visually estimated to be 45-50%.   Grade I diastolic dysfunction with normal LV filling pressures. The right ventricle is normal in size. Right ventricular systolic function is mildly reduced. There are no significant valvular abnormalities appreciated in this study. Stress Test:     Cath:  Cardiac Cath 10/31/2020 (Dr Idris Dunlap)   Successful PTCA stenting of a subtotally occluded right  coronary artery. The patient was chest pain free after deployment of a  3.5 mm x 15 mm Xience Abby drug-eluting stent. No dissection or dye  extravasation was noted. There was JIE grade III flow with nearly 0%  residual stenosis. There is a 40% stenosis of obtuse marginal branch  #1. It is not the culprit vessel. There is JIE grade III flow there. The remaining coronary arteries appear to be essentially normal.  LV  ejection fraction 40%, inferior wall hypokinesia. Other imaging:     Assessment and Plan     Coronary artery disease  Asymptomatic. Continue Asa, Brilinta, B-blocker and statin therapy. Will place referral to cardiac rehab. Encouraged regular walking program.     Hyperlipidemia  Continue high intensity statin therapy. Diabetes  Managed by PCP. COPD  Managed by PCP. Follow up in 6 months. Thank you for allowing us to participate in the care of [address] Please do not hesitate to contact me if you have any questions. Butch Don MD, MPH    [facility], [address]  Ph: [phone]  Fax: [phone]      This note was scribed in the presence of Dr Jo-Ann Romero, by Princess Devries RN  Physician Attestation:  The scribes documentation has been prepared under my direction and personally reviewed by me in its entirety. I confirm that the note above accurately reflects all work, treatment, procedures, and medical decision making performed by me.

## 2020-11-17 ENCOUNTER — OFFICE VISIT (OUTPATIENT)
Dept: CARDIOLOGY CLINIC | Age: 60
End: 2020-11-17
Payer: COMMERCIAL

## 2020-11-17 VITALS
HEIGHT: 61 IN | WEIGHT: 185.6 LBS | DIASTOLIC BLOOD PRESSURE: 70 MMHG | TEMPERATURE: 97.2 F | HEART RATE: 100 BPM | BODY MASS INDEX: 35.04 KG/M2 | OXYGEN SATURATION: 98 % | SYSTOLIC BLOOD PRESSURE: 128 MMHG

## 2020-11-17 PROCEDURE — 99213 OFFICE O/P EST LOW 20 MIN: CPT | Performed by: INTERNAL MEDICINE

## 2020-11-17 NOTE — PATIENT INSTRUCTIONS
Patient Education        Heart-Healthy Diet: Care Instructions  Your Care Instructions     A heart-healthy diet has lots of vegetables, fruits, nuts, beans, and whole grains, and is low in salt. It limits foods that are high in saturated fat, such as meats, cheeses, and fried foods. It may be hard to change your diet, but even small changes can lower your risk of heart attack and heart disease. Follow-up care is a key part of your treatment and safety. Be sure to make and go to all appointments, and call your doctor if you are having problems. It's also a good idea to know your test results and keep a list of the medicines you take. How can you care for yourself at home? Watch your portions  · Learn what a serving is. A \"serving\" and a \"portion\" are not always the same thing. Make sure that you are not eating larger portions than are recommended. For example, a serving of pasta is ½ cup. A serving size of meat is 2 to 3 ounces. A 3-ounce serving is about the size of a deck of cards. Measure serving sizes until you are good at [place]" them. Keep in mind that restaurants often serve portions that are 2 or 3 times the size of one serving. · To keep your energy level up and keep you from feeling hungry, eat often but in smaller portions. · Eat only the number of calories you need to stay at a healthy weight. If you need to lose weight, eat fewer calories than your body burns (through exercise and other physical activity). Eat more fruits and vegetables  · Eat a variety of fruit and vegetables every day. Dark green, deep orange, red, or yellow fruits and vegetables are especially good for you. Examples include spinach, carrots, peaches, and berries. · Keep carrots, celery, and other veggies handy for snacks. Buy fruit that is in season and store it where you can see it so that you will be tempted to eat it. · Cook dishes that have a lot of veggies in them, such as stir-fries and soups.   Limit saturated and Instructions. \"     If you do not have an account, please click on the \"Sign Up Now\" link. Current as of: August 22, 2019               Content Version: 12.6  © 5853-3014 [employer]. Care instructions adapted under license by [facility] ([facility]). If you have questions about a medical condition or this instruction, always ask your healthcare professional. [address] any warranty or liability for your use of this information.

## 2020-11-23 RX ORDER — METOPROLOL SUCCINATE 25 MG/1
25 TABLET, EXTENDED RELEASE ORAL DAILY
Qty: 30 TABLET | Refills: 3 | Status: SHIPPED | OUTPATIENT
Start: 2020-11-23 | End: 2021-03-29

## 2020-11-23 RX ORDER — ATORVASTATIN CALCIUM 80 MG/1
80 TABLET, FILM COATED ORAL NIGHTLY
Qty: 30 TABLET | Refills: 3 | Status: SHIPPED | OUTPATIENT
Start: 2020-11-23 | End: 2021-03-29

## 2020-11-23 NOTE — TELEPHONE ENCOUNTER
Medication Refill    Medication needing refilled:BRILINTA    Dosage of the medication:90 mg    How are you taking this medication (QD, BID, TID, QID, PRN):Take 1 tablet by mouth 2 times daily     30 or 90 day supply called in:60    Which Pharmacy are we sending the medication to?:[facility] [address] [phone]         Medication Refill    Medication needing refilled:METOPROLOL SUCCINATE    Dosage of the medication:25mg    How are you taking this medication (QD, BID, TID, QID, PRN):Take 1 tablet by mouth daily     30 or 90 day supply called in:60    Which Pharmacy are we sending the medication to?:[facility] [address] [place] [address] [phone]       Medication Refill    Medication needing refilled:LIPITOR    Dosage of the medication:80mg    How are you taking this medication (QD, BID, TID, QID, PRN):Take 1 tablet by mouth nightly     30 or 90 day supply called in:60    Which Pharmacy are we sending the medication to?:[facility] [address] [phone]

## 2020-11-30 RX ORDER — VALSARTAN 40 MG/1
40 TABLET ORAL DAILY
Qty: 30 TABLET | Refills: 3 | Status: SHIPPED | OUTPATIENT
Start: 2020-11-30 | End: 2021-03-29

## 2021-03-16 ENCOUNTER — TELEPHONE (OUTPATIENT)
Dept: CARDIOLOGY CLINIC | Age: 61
End: 2021-03-16

## 2021-03-16 NOTE — TELEPHONE ENCOUNTER
I called Marino Zarate this afternoon to reschedule her 6 mo/fu per Courtney Mcfarland stated she has been feeling dizzy and her HR has been high and that she was going to call. She asked to be seen sooner I scheduled pt for April 20th. She wasn't sure if she needed to do anything before being seen.        You can reach Marino Zarate at [phone]

## 2021-03-30 RX ORDER — VALSARTAN 40 MG/1
TABLET ORAL
Qty: 90 TABLET | Refills: 3 | Status: SHIPPED | OUTPATIENT
Start: 2021-03-30 | End: 2022-03-28

## 2021-03-30 RX ORDER — METOPROLOL SUCCINATE 25 MG/1
TABLET, EXTENDED RELEASE ORAL
Qty: 90 TABLET | Refills: 3 | Status: SHIPPED | OUTPATIENT
Start: 2021-03-30 | End: 2022-03-28

## 2021-03-30 RX ORDER — ATORVASTATIN CALCIUM 80 MG/1
TABLET, FILM COATED ORAL
Qty: 90 TABLET | Refills: 3 | Status: SHIPPED | OUTPATIENT
Start: 2021-03-30 | End: 2022-03-28

## 2021-04-20 ENCOUNTER — OFFICE VISIT (OUTPATIENT)
Dept: CARDIOLOGY CLINIC | Age: 61
End: 2021-04-20
Payer: COMMERCIAL

## 2021-04-20 VITALS
SYSTOLIC BLOOD PRESSURE: 120 MMHG | HEIGHT: 61 IN | HEART RATE: 72 BPM | WEIGHT: 178.2 LBS | DIASTOLIC BLOOD PRESSURE: 80 MMHG | OXYGEN SATURATION: 98 % | BODY MASS INDEX: 33.64 KG/M2

## 2021-04-20 DIAGNOSIS — R07.9 CHEST PAIN, UNSPECIFIED TYPE: ICD-10-CM

## 2021-04-20 DIAGNOSIS — I25.10 CORONARY ARTERY DISEASE INVOLVING NATIVE CORONARY ARTERY OF NATIVE HEART WITHOUT ANGINA PECTORIS: Primary | ICD-10-CM

## 2021-04-20 DIAGNOSIS — E78.2 MIXED HYPERLIPIDEMIA: ICD-10-CM

## 2021-04-20 PROCEDURE — 99213 OFFICE O/P EST LOW 20 MIN: CPT | Performed by: INTERNAL MEDICINE

## 2021-04-20 NOTE — PATIENT INSTRUCTIONS

## 2021-04-20 NOTE — PROGRESS NOTES
[facility]  Cardiology Consult    Eri Love  1960 April 20, 2021      Reason for Referral: Coronary artery disease    CC: \"I am okay. \"      Subjective:     History of Present Illness:    Eri Love is a 63 y.o. patient with a PMH significant for tobacco use, COPD, and hyperlipidemia. She presented to [facility] emergency room on 10/31/2020 with complaints of chest pain and ECG showed inferior ST elevation. She was taken to the cath lab and underwent placement of stent to occluded RCA  Today, she is here for follow up for CAD. She says that she is feeling okay. She says that she has not been checking her blood sugars on a regular basis. Patient denies exertional chest pain/pressure, dyspnea at rest, AVILES, PND, orthopnea, palpitations, lightheadedness, weight changes, changes in LE edema, and syncope. Past Medical History:   has a past medical history of Asthma, Elevated C-reactive protein (CRP), Hemorrhoid, Hyperlipidemia, Hypertriglyceridemia, Menopause, Osteoarthritis, and Overweight(278.02). Surgical History:   has a past surgical history that includes Tubal ligation (3017) and Diagnostic Cardiac Cath Lab Procedure. Social History:   reports that she has quit smoking. Her smoking use included cigarettes. She has a 37.00 pack-year smoking history. She has never used smokeless tobacco. She reports that she does not drink alcohol or use drugs. Family History:  family history includes Cancer (age of onset: 25) in her brother; Heart Disease in her paternal grandmother; Heart Disease (age of onset: 21) in her brother. Home Medications:  Were reviewed and are listed in nursing record and/or below  Prior to Admission medications    Medication Sig Start Date End Date Taking?  Authorizing Provider   metoprolol succinate (TOPROL XL) 25 MG extended release tablet TAKE ONE TABLET BY MOUTH DAILY 3/30/21  Yes Rosalio Chung MD   valsartan (DIOVAN) 40 MG tablet TAKE ONE TABLET BY MOUTH DAILY 3/30/21  Yes Cole Beck MD   atorvastatin (LIPITOR) 80 MG tablet TAKE ONE TABLET BY MOUTH ONCE NIGHTLY 3/30/21  Yes Cole Beck MD   [facility]relor [facility]) 90 MG TABS tablet Take 1 tablet by mouth 2 times daily 11/23/20  Yes Katy Gudion MD   metFORMIN (GLUCOPHAGE) 500 MG tablet Take 1 tablet by mouth 2 times daily (with meals) 11/2/20  Yes Ivory Carter MD   Blood Glucose Monitoring Suppl (TRUE METRIX METER) w/Device KIT Test blood sugars 3 times a day before meals 11/2/20  Yes Ivory Carter MD   blood glucose test strips (TRUE METRIX BLOOD GLUCOSE TEST) strip 1 each by In Vitro route 3 times daily As needed. 11/2/20  Yes Ivory Carter MD   Lancet Devices (LANCING DEVICE) MISC Test blood sugars 3 times a day before meals 11/2/20  Yes Ivory Carter MD   Lancets MISC 1 each by Does not apply route 3 times daily 11/2/20  Yes Ivory Carter MD   aspirin 81 MG chewable tablet Take 1 tablet by mouth daily 11/3/20  Yes Cole Beck MD   Fluticasone furoate-vilanterol (BREO ELLIPTA) 200-25 MCG/INH AEPB inhaler Inhale 1 Inhaler into the lungs daily 4/21/20  Yes Historical Provider, MD   fluticasone (FLONASE) 50 MCG/ACT nasal spray 2 sprays by Nasal route daily 10/19/20  Yes Historical Provider, MD   ipratropium-albuterol (DUONEB) 0.5-2.5 (3) MG/3ML SOLN nebulizer solution USE 3 MLS WITH NEBULIZER FOUR TIMES DAILY 4/6/20  Yes Historical Provider, MD   methocarbamol (ROBAXIN) 750 MG tablet Take 750 mg by mouth 3 times daily 9/16/20  Yes Historical Provider, MD   montelukast (SINGULAIR) 10 MG tablet Take 10 mg by mouth nightly 9/15/20  Yes Historical Provider, MD   albuterol sulfate HFA (PROAIR HFA) 108 (90 Base) MCG/ACT inhaler Inhale 2 puffs into the lungs every 6 hours as needed for Wheezing 8/12/18  Yes Arlette Oalkey MD        CURRENT Medications:  No current facility-administered medications for this visit.        Allergies:  Penicillins           Review of Systems: SEE HPI   · Constitutional: no unanticipated weight loss. There's been no change in energy level, sleep pattern, or activity level. No fevers, chills. · Eyes: No visual changes or diplopia. No scleral icterus. · ENT: No Headaches, hearing loss or vertigo. No mouth sores or sore throat. · Cardiovascular: No Chest pain, tightness or discomfort.  No Shortness of breath. No Dyspnea on exertion, Orthopnea, Paroxysmal nocturnal dyspnea or breathlessness at rest.   No Palpitations.  No Syncope ('blackouts', 'faints', 'collapse') or dizziness. · Respiratory: No cough or wheezing, no sputum production. No hematemesis. · Gastrointestinal: No abdominal pain, appetite loss, blood in stools. No change in bowel or bladder habits. · Genitourinary: No dysuria, trouble voiding, or hematuria. · Musculoskeletal:  No gait disturbance, no joint complaints. · Integumentary: No rash or pruritis. · Neurological: No headache, diplopia, change in muscle strength, numbness or tingling. · Psychiatric: No anxiety or depression. · Endocrine: No temperature intolerance. No excessive thirst, fluid intake, or urination. No tremor. · Hematologic/Lymphatic: No abnormal bruising or bleeding, blood clots or swollen lymph nodes. · Allergic/Immunologic: No nasal congestion or hives. Objective:     PHYSICAL EXAM:      Vitals:    04/20/21 1151   BP: 120/80   Pulse: 72   SpO2: 98%    Weight: 178 lb 3.2 oz (80.8 kg)       General Appearance:  Alert, cooperative, no distress, appears stated age. Head:  Normocephalic, without obvious abnormality, atraumatic. Eyes:  Pupils equal and round. No scleral icterus. Mouth: Moist mucosa, no pharyngeal erythema. Nose: Nares normal. No drainage or sinus tenderness. Neck: Supple, symmetrical, trachea midline. No adenopathy. No tenderness/mass/nodules. No carotid bruit or elevated JVD.    Lungs:   Respiratory Effort: Normal   Auscultation: Clear to auscultation bilaterally, respirations unlabored. No wheeze, rales   Chest Wall:  No tenderness or deformity. Cardiovascular:    Pulses  Palpation: normal   Ascultation: Regular rate, S1/ S2 normal. No murmur, rub, or gallop. 2+ radial and pedal pulses, symmetric  Carotid  Femoral   Abdomen and Gastrointestinal:   Soft, non-tender, bowel sounds active. Liver and Spleen  Masses   Musculoskeletal: No muscle wasting  Back  Gait   Extremities: Extremities normal, atraumatic. No cyanosis or edema. No cyanosis clubbing       Skin: Inspection and palpation performed, no rashes or lesions. Pysch: Normal mood and affect. Alert and oriented to time place person   Neurologic: Normal gross motor and sensory exam.       Labs     All labs have been reviewed    Lab Results   Component Value Date    WBC 10.5 11/02/2020    RBC 4.83 11/02/2020    HGB 14.8 11/02/2020    HCT 43.1 11/02/2020    MCV 89.2 11/02/2020    RDW 13.6 11/02/2020     11/02/2020     Lab Results   Component Value Date     11/01/2020    K 3.6 11/01/2020    CL 95 11/01/2020    CO2 21 11/01/2020    BUN 15 11/01/2020    CREATININE 0.8 11/01/2020    GFRAA >60 11/01/2020    GFRAA >60 03/28/2011    AGRATIO 1.3 08/12/2018    LABGLOM >60 11/01/2020    GLUCOSE 145 11/01/2020    PROT 7.2 08/12/2018    PROT 7.0 12/01/2010    CALCIUM 9.5 11/01/2020    BILITOT 0.3 08/12/2018    ALKPHOS 79 08/12/2018    AST 16 08/12/2018    ALT 20 08/12/2018     No results found for: PTINR  Lab Results   Component Value Date    LABA1C 7.5 10/31/2020     Lab Results   Component Value Date    TROPONINI 0.24 (H) 10/31/2020       Cardiac, Vascular and Imaging Data all Personally Reviewed in Detail by Myself      EKG:     Echocardiogram:   ECHO 10/31/2020  Normal left ventricular size and wall thickness. There is low normal to mildly reduced systolic function with basal to mid  inferior wall hypokinesis. Ejection fraction is visually estimated to be 45-50%.   Grade I diastolic dysfunction with normal LV filling performed by me.

## 2021-05-07 ENCOUNTER — HOSPITAL ENCOUNTER (OUTPATIENT)
Dept: NON INVASIVE DIAGNOSTICS | Age: 61
Discharge: HOME | End: 2021-05-07
Payer: COMMERCIAL

## 2021-05-07 DIAGNOSIS — R07.9 CHEST PAIN, UNSPECIFIED TYPE: ICD-10-CM

## 2021-05-07 DIAGNOSIS — I25.10 CORONARY ARTERY DISEASE INVOLVING NATIVE CORONARY ARTERY OF NATIVE HEART WITHOUT ANGINA PECTORIS: ICD-10-CM

## 2021-05-07 DIAGNOSIS — E78.2 MIXED HYPERLIPIDEMIA: ICD-10-CM

## 2021-05-07 LAB
CHOLESTEROL, FASTING: 104 MG/DL (ref 0–199)
HDLC SERPL-MCNC: 33 MG/DL (ref 40–60)
LDL CHOLESTEROL CALCULATED: 23 MG/DL
LV EF: 71 %
LVEF MODALITY: NORMAL
TRIGLYCERIDE, FASTING: 240 MG/DL (ref 0–150)
VLDLC SERPL CALC-MCNC: 48 MG/DL

## 2021-05-07 PROCEDURE — 6360000002 HC RX W HCPCS: Performed by: INTERNAL MEDICINE

## 2021-05-07 PROCEDURE — A9502 TC99M TETROFOSMIN: HCPCS | Performed by: INTERNAL MEDICINE

## 2021-05-07 PROCEDURE — 93017 CV STRESS TEST TRACING ONLY: CPT

## 2021-05-07 PROCEDURE — 78452 HT MUSCLE IMAGE SPECT MULT: CPT

## 2021-05-07 PROCEDURE — 3430000000 HC RX DIAGNOSTIC RADIOPHARMACEUTICAL: Performed by: INTERNAL MEDICINE

## 2021-05-07 RX ADMIN — TETROFOSMIN 10 MILLICURIE: 1.38 INJECTION, POWDER, LYOPHILIZED, FOR SOLUTION INTRAVENOUS at 08:59

## 2021-05-07 RX ADMIN — REGADENOSON 0.4 MG: 0.08 INJECTION, SOLUTION INTRAVENOUS at 10:30

## 2021-05-07 RX ADMIN — TETROFOSMIN 30 MILLICURIE: 1.38 INJECTION, POWDER, LYOPHILIZED, FOR SOLUTION INTRAVENOUS at 11:15

## 2021-05-10 ENCOUNTER — TELEPHONE (OUTPATIENT)
Dept: CARDIOLOGY CLINIC | Age: 61
End: 2021-05-10

## 2021-05-10 DIAGNOSIS — Z01.818 PREOP TESTING: Primary | ICD-10-CM

## 2021-05-10 DIAGNOSIS — R94.39 ABNORMAL STRESS TEST: ICD-10-CM

## 2021-05-10 NOTE — TELEPHONE ENCOUNTER
Dinorah Beltrán MD   5/9/2021  5:56 PM EDT      Stress test is intermediate not clearly abnormal   Please ask her symptoms again  We should cath her please set up a [facility]            Scheduled for diagnostic LHC on Wednesday, 5/19/21 at 11 AM with 930 AM check in. The morning of your procedure you will park in the hospital parking lot and report directly to the cath lab to check in.     Pre-Procedure Instructions   1. You will need to fast for at least 8 hours prior to procedure. No caffeine the morning of.   2. Hold all diabetic medications including, Metfomin. If you take Lantus/Levemir only take ½ your normal dose the evening before. All other medications can be taken in the morning with sips of water. 3. Do not use any lotions, creams or perfume the morning of procedure. 4. Pre-procedure lab work will need to be completed 5-7 days prior to procedure. 5. Please have a responsible adult to drive you home after procedure. We advise you have someone to stay with you for 24 hours following procedure for precautionary measures. Depending on procedure you may require an overnight stay. 6. Cath lab will provide you with all post procedure instructions. If you have any questions regarding the procedure itself or medications, please call [phone] and ask to speak with a nurse. Procedure published on [employer] and procedure form emailed to Claudine Lowe. Patient v/u to all instructions.

## 2021-05-18 DIAGNOSIS — Z01.818 PREOP TESTING: ICD-10-CM

## 2021-05-18 LAB
ANION GAP SERPL CALCULATED.3IONS-SCNC: 13 MMOL/L (ref 3–16)
BASOPHILS ABSOLUTE: 0.1 K/UL (ref 0–0.2)
BASOPHILS RELATIVE PERCENT: 0.6 %
BUN BLDV-MCNC: 13 MG/DL (ref 7–20)
CALCIUM SERPL-MCNC: 9.7 MG/DL (ref 8.3–10.6)
CHLORIDE BLD-SCNC: 107 MMOL/L (ref 99–110)
CO2: 24 MMOL/L (ref 21–32)
CREAT SERPL-MCNC: 0.8 MG/DL (ref 0.6–1.1)
EOSINOPHILS ABSOLUTE: 0.3 K/UL (ref 0–0.6)
EOSINOPHILS RELATIVE PERCENT: 3.2 %
GFR AFRICAN AMERICAN: >60
GFR NON-AFRICAN AMERICAN: >60
GLUCOSE BLD-MCNC: 107 MG/DL (ref 70–99)
HCT VFR BLD CALC: 40.3 % (ref 36–48)
HEMOGLOBIN: 13.4 G/DL (ref 12–16)
LYMPHOCYTES ABSOLUTE: 2.7 K/UL (ref 1–5.1)
LYMPHOCYTES RELATIVE PERCENT: 28.3 %
MCH RBC QN AUTO: 30.9 PG (ref 26–34)
MCHC RBC AUTO-ENTMCNC: 33.2 G/DL (ref 31–36)
MCV RBC AUTO: 93.3 FL (ref 80–100)
MONOCYTES ABSOLUTE: 0.6 K/UL (ref 0–1.3)
MONOCYTES RELATIVE PERCENT: 6.4 %
NEUTROPHILS ABSOLUTE: 5.9 K/UL (ref 1.7–7.7)
NEUTROPHILS RELATIVE PERCENT: 61.5 %
PDW BLD-RTO: 13.8 % (ref 12.4–15.4)
PLATELET # BLD: 354 K/UL (ref 135–450)
PMV BLD AUTO: 8.7 FL (ref 5–10.5)
POTASSIUM SERPL-SCNC: 4.5 MMOL/L (ref 3.5–5.1)
RBC # BLD: 4.32 M/UL (ref 4–5.2)
SODIUM BLD-SCNC: 144 MMOL/L (ref 136–145)
WBC # BLD: 9.5 K/UL (ref 4–11)

## 2021-05-18 NOTE — PRE-PROCEDURE INSTRUCTIONS
Called patient to confirm procedure times. Patient to arrive at 0930 for procedure start time of 1100. Reviewed NPO after MN, patient to take meds with sip of water day of procedure. Hold metformin day before procedure.     Randell Vinson RN, CCRN-CSC  Electronically signed by Randell Vinson RN on 5/18/2021 at 1:37 PM

## 2021-05-19 ENCOUNTER — HOSPITAL ENCOUNTER (OUTPATIENT)
Dept: CARDIAC CATH/INVASIVE PROCEDURES | Age: 61
LOS: 1 days | Discharge: HOME | End: 2021-05-19
Attending: INTERNAL MEDICINE | Admitting: INTERNAL MEDICINE
Payer: COMMERCIAL

## 2021-05-19 VITALS
DIASTOLIC BLOOD PRESSURE: 75 MMHG | WEIGHT: 173 LBS | HEIGHT: 61 IN | SYSTOLIC BLOOD PRESSURE: 112 MMHG | RESPIRATION RATE: 20 BRPM | TEMPERATURE: 97.7 F | HEART RATE: 87 BPM | OXYGEN SATURATION: 98 % | BODY MASS INDEX: 32.66 KG/M2

## 2021-05-19 DIAGNOSIS — I25.10 CAD IN NATIVE ARTERY: ICD-10-CM

## 2021-05-19 DIAGNOSIS — R94.39 ABNORMAL STRESS TEST: ICD-10-CM

## 2021-05-19 LAB
EKG ATRIAL RATE: 86 BPM
EKG DIAGNOSIS: NORMAL
EKG P AXIS: 66 DEGREES
EKG P-R INTERVAL: 156 MS
EKG Q-T INTERVAL: 350 MS
EKG QRS DURATION: 82 MS
EKG QTC CALCULATION (BAZETT): 418 MS
EKG R AXIS: 15 DEGREES
EKG T AXIS: 15 DEGREES
EKG VENTRICULAR RATE: 86 BPM
POC ACT LR: 281 SEC

## 2021-05-19 PROCEDURE — 99152 MOD SED SAME PHYS/QHP 5/>YRS: CPT

## 2021-05-19 PROCEDURE — C1769 GUIDE WIRE: HCPCS

## 2021-05-19 PROCEDURE — 2580000003 HC RX 258

## 2021-05-19 PROCEDURE — 2709999900 HC NON-CHARGEABLE SUPPLY

## 2021-05-19 PROCEDURE — 6360000002 HC RX W HCPCS: Performed by: INTERNAL MEDICINE

## 2021-05-19 PROCEDURE — C1725 CATH, TRANSLUMIN NON-LASER: HCPCS

## 2021-05-19 PROCEDURE — C1753 CATH, INTRAVAS ULTRASOUND: HCPCS

## 2021-05-19 PROCEDURE — 2500000003 HC RX 250 WO HCPCS

## 2021-05-19 PROCEDURE — 93458 L HRT ARTERY/VENTRICLE ANGIO: CPT

## 2021-05-19 PROCEDURE — C1894 INTRO/SHEATH, NON-LASER: HCPCS

## 2021-05-19 PROCEDURE — C1887 CATHETER, GUIDING: HCPCS

## 2021-05-19 PROCEDURE — 6370000000 HC RX 637 (ALT 250 FOR IP): Performed by: INTERNAL MEDICINE

## 2021-05-19 PROCEDURE — 92978 ENDOLUMINL IVUS OCT C 1ST: CPT | Performed by: INTERNAL MEDICINE

## 2021-05-19 PROCEDURE — C1874 STENT, COATED/COV W/DEL SYS: HCPCS

## 2021-05-19 PROCEDURE — 92928 PRQ TCAT PLMT NTRAC ST 1 LES: CPT

## 2021-05-19 PROCEDURE — 6360000002 HC RX W HCPCS

## 2021-05-19 PROCEDURE — 92928 PRQ TCAT PLMT NTRAC ST 1 LES: CPT | Performed by: INTERNAL MEDICINE

## 2021-05-19 PROCEDURE — 6360000004 HC RX CONTRAST MEDICATION: Performed by: INTERNAL MEDICINE

## 2021-05-19 PROCEDURE — 93005 ELECTROCARDIOGRAM TRACING: CPT | Performed by: INTERNAL MEDICINE

## 2021-05-19 PROCEDURE — 85347 COAGULATION TIME ACTIVATED: CPT

## 2021-05-19 PROCEDURE — 93458 L HRT ARTERY/VENTRICLE ANGIO: CPT | Performed by: INTERNAL MEDICINE

## 2021-05-19 PROCEDURE — 92978 ENDOLUMINL IVUS OCT C 1ST: CPT

## 2021-05-19 PROCEDURE — 93010 ELECTROCARDIOGRAM REPORT: CPT | Performed by: INTERNAL MEDICINE

## 2021-05-19 PROCEDURE — 99153 MOD SED SAME PHYS/QHP EA: CPT

## 2021-05-19 PROCEDURE — 6370000000 HC RX 637 (ALT 250 FOR IP)

## 2021-05-19 RX ORDER — SODIUM CHLORIDE 0.9 % (FLUSH) 0.9 %
5-40 SYRINGE (ML) INJECTION PRN
Status: DISCONTINUED | OUTPATIENT
Start: 2021-05-19 | End: 2021-05-20 | Stop reason: HOSPADM

## 2021-05-19 RX ORDER — SODIUM CHLORIDE 0.9 % (FLUSH) 0.9 %
5-40 SYRINGE (ML) INJECTION EVERY 12 HOURS SCHEDULED
Status: DISCONTINUED | OUTPATIENT
Start: 2021-05-19 | End: 2021-05-20 | Stop reason: HOSPADM

## 2021-05-19 RX ORDER — SODIUM CHLORIDE 0.9 % (FLUSH) 0.9 %
5-40 SYRINGE (ML) INJECTION PRN
Status: CANCELLED | OUTPATIENT
Start: 2021-05-19

## 2021-05-19 RX ORDER — IPRATROPIUM BROMIDE AND ALBUTEROL SULFATE 2.5; .5 MG/3ML; MG/3ML
1 SOLUTION RESPIRATORY (INHALATION) EVERY 4 HOURS PRN
Status: CANCELLED | OUTPATIENT
Start: 2021-05-19

## 2021-05-19 RX ORDER — FLUTICASONE FUROATE AND VILANTEROL 200; 25 UG/1; UG/1
1 POWDER RESPIRATORY (INHALATION) DAILY
Status: CANCELLED | OUTPATIENT
Start: 2021-05-19

## 2021-05-19 RX ORDER — SODIUM CHLORIDE 0.9 % (FLUSH) 0.9 %
5-40 SYRINGE (ML) INJECTION EVERY 12 HOURS SCHEDULED
Status: CANCELLED | OUTPATIENT
Start: 2021-05-19

## 2021-05-19 RX ORDER — METOPROLOL SUCCINATE 25 MG/1
1 TABLET, EXTENDED RELEASE ORAL DAILY
Status: CANCELLED | OUTPATIENT
Start: 2021-05-19

## 2021-05-19 RX ORDER — SODIUM CHLORIDE 9 MG/ML
INJECTION, SOLUTION INTRAVENOUS CONTINUOUS
Status: CANCELLED | OUTPATIENT
Start: 2021-05-19

## 2021-05-19 RX ORDER — ALBUTEROL SULFATE 90 UG/1
2 AEROSOL, METERED RESPIRATORY (INHALATION) EVERY 6 HOURS PRN
Status: CANCELLED | OUTPATIENT
Start: 2021-05-19

## 2021-05-19 RX ORDER — SODIUM CHLORIDE 9 MG/ML
25 INJECTION, SOLUTION INTRAVENOUS PRN
Status: DISCONTINUED | OUTPATIENT
Start: 2021-05-19 | End: 2021-05-20 | Stop reason: HOSPADM

## 2021-05-19 RX ORDER — SODIUM CHLORIDE 9 MG/ML
25 INJECTION, SOLUTION INTRAVENOUS PRN
Status: CANCELLED | OUTPATIENT
Start: 2021-05-19

## 2021-05-19 RX ORDER — DIPHENHYDRAMINE HYDROCHLORIDE 50 MG/ML
25 INJECTION INTRAMUSCULAR; INTRAVENOUS ONCE
Status: COMPLETED | OUTPATIENT
Start: 2021-05-19 | End: 2021-05-19

## 2021-05-19 RX ORDER — VALSARTAN 40 MG/1
1 TABLET ORAL DAILY
Status: CANCELLED | OUTPATIENT
Start: 2021-05-19

## 2021-05-19 RX ORDER — METHYLPREDNISOLONE SODIUM SUCCINATE 125 MG/2ML
125 INJECTION, POWDER, LYOPHILIZED, FOR SOLUTION INTRAMUSCULAR; INTRAVENOUS ONCE
Status: COMPLETED | OUTPATIENT
Start: 2021-05-19 | End: 2021-05-19

## 2021-05-19 RX ORDER — ACETAMINOPHEN 325 MG/1
650 TABLET ORAL EVERY 4 HOURS PRN
Status: CANCELLED | OUTPATIENT
Start: 2021-05-19

## 2021-05-19 RX ORDER — ATORVASTATIN CALCIUM 80 MG/1
80 TABLET, FILM COATED ORAL DAILY
Status: CANCELLED | OUTPATIENT
Start: 2021-05-19

## 2021-05-19 RX ORDER — ONDANSETRON 2 MG/ML
4 INJECTION INTRAMUSCULAR; INTRAVENOUS EVERY 6 HOURS PRN
Status: CANCELLED | OUTPATIENT
Start: 2021-05-19

## 2021-05-19 RX ORDER — SODIUM CHLORIDE 9 MG/ML
INJECTION, SOLUTION INTRAVENOUS CONTINUOUS
Status: DISCONTINUED | OUTPATIENT
Start: 2021-05-19 | End: 2021-05-20 | Stop reason: HOSPADM

## 2021-05-19 RX ORDER — ASPIRIN 325 MG
325 TABLET ORAL ONCE
Status: COMPLETED | OUTPATIENT
Start: 2021-05-19 | End: 2021-05-19

## 2021-05-19 RX ORDER — ASPIRIN 81 MG/1
81 TABLET, CHEWABLE ORAL DAILY
Status: CANCELLED | OUTPATIENT
Start: 2021-05-19

## 2021-05-19 RX ORDER — MONTELUKAST SODIUM 10 MG/1
10 TABLET ORAL NIGHTLY
Status: CANCELLED | OUTPATIENT
Start: 2021-05-19

## 2021-05-19 RX ADMIN — IOPAMIDOL 100 ML: 755 INJECTION, SOLUTION INTRAVENOUS at 12:07

## 2021-05-19 RX ADMIN — METHYLPREDNISOLONE SODIUM SUCCINATE 125 MG: 125 INJECTION, POWDER, FOR SOLUTION INTRAMUSCULAR; INTRAVENOUS at 09:59

## 2021-05-19 RX ADMIN — DIPHENHYDRAMINE HYDROCHLORIDE 25 MG: 50 INJECTION, SOLUTION INTRAMUSCULAR; INTRAVENOUS at 09:58

## 2021-05-19 RX ADMIN — ASPIRIN 325 MG ORAL TABLET 325 MG: 325 PILL ORAL at 09:57

## 2021-05-19 NOTE — PROCEDURES
[address] [place] [address]                            CARDIAC CATHETERIZATION    PATIENT NAME: Dione Record                  :        1960  MED REC NO:   [id number]                          ROOM:  ACCOUNT NO:   [de-identified]                           ADMIT DATE: 2021  PROVIDER:     Rafi Starr MD    DATE OF PROCEDURE:  2021    PROCEDURE PERFORMED:  1. Left heart catheterization. 2.  Percutaneous coronary intervention of the right coronary artery  using intravascular ultrasound support. Stent placed 4.0 x 15-mm  drug-coated Resolute dilating up to 4.5 mm with a noncompliant balloon. ASA is II. Mallampati score II. Informed consent obtained. PROCEDURE IN DETAIL:  The patient was brought to cardiac cath  laboratory. Right radial was prepped and draped in sterile fashion. We  accessed the right radial artery. We inserted a slender sheath. JL3.5  was used to select and engage the left main. We performed angiography  of the left system. JL was removed. Ardell Busman was advanced and used to  select and engage the right coronary artery. We performed angiography  of the right system. Ardell Busman was removed. Pigtail was advanced across the  aortic valve into the LV cavity. We performed LV gram and LV  hemodynamics. Pigtail was removed. JR4 guide selected and engaged the right coronary artery ostium. Coronary guidewire was advanced. Intravascular ultrasound was performed  of the right coronary artery. Stent was placed, 4.0 x 15 drug-coated Resolute, in the mid right  coronary artery and deployed there. Stent was post dilated with a  4.5-mm noncompliant balloon. Repeat intravascular ultrasound was  performed. All catheters and wires were removed. Sheaths were removed. Radial band was applied to achieve hemostasis. No complication. Estimated blood loss less than 20 mL.     ANGIOGRAPHY FINDINGS: 1.  Left main normal.  2.  Left anterior descending artery is normal, JIE 3 flow, no stenosis. 3.  Left circumflex patent, JIE 3 flow, no stenosis. 4.  Right coronary artery comes from right coronary cusp. This is a  right dominant system. Distally, there is an older stent. There is an  edge dissection with 70% stenosis. 5.  LV ejection fraction 60%. SUMMARY:  Successful revascularization of right coronary artery,  placement of one drug-coated stent, 4.0 x 15 post dilating up to 4.5 mm. RECOMMENDATION:  1. Continue postop post cath care. 2.  Site care. 3.  Risk factor modification. 4.  Dual antiplatelet therapy.         Christopher Herrera MD    D: 05/19/2021 12:13:28       T: 05/19/2021 14:36:11     [id number]  Job#: [id number]     Doc#: [id number]    CC:

## 2021-05-19 NOTE — H&P
Reason for Referral: Coronary artery disease     CC: \"I am okay. \"        Subjective:      History of Present Illness:     Candice Andrade is a 63 y.o. patient with a PMH significant for tobacco use, COPD, and hyperlipidemia. She presented to [facility] emergency room on 10/31/2020 with complaints of chest pain and ECG showed inferior ST elevation. She was taken to the cath lab and underwent placement of stent to occluded RCA  Today, she is here for follow up for CAD. She says that she is feeling okay. She says that she has not been checking her blood sugars on a regular basis. Patient denies exertional chest pain/pressure, dyspnea at rest, AVILES, PND, orthopnea, palpitations, lightheadedness, weight changes, changes in LE edema, and syncope.       Past Medical History:   has a past medical history of Asthma, Elevated C-reactive protein (CRP), Hemorrhoid, Hyperlipidemia, Hypertriglyceridemia, Menopause, Osteoarthritis, and Overweight(278.02).    Surgical History:   has a past surgical history that includes Tubal ligation (1986) and Diagnostic Cardiac Cath Lab Procedure.      Social History:   reports that she has quit smoking. Her smoking use included cigarettes. She has a 37.00 pack-year smoking history. She has never used smokeless tobacco. She reports that she does not drink alcohol or use drugs.      Family History:  family history includes Cancer (age of onset: 25) in her brother; Heart Disease in her paternal grandmother; Heart Disease (age of onset: 21) in her brother.     Home Medications:  Were reviewed and are listed in nursing record and/or below  Home Medications           Prior to Admission medications    Medication Sig Start Date End Date Taking?  Authorizing Provider   metoprolol succinate (TOPROL XL) 25 MG extended release tablet TAKE ONE TABLET BY MOUTH DAILY 3/30/21   Yes Roosevelt Fiore MD   valsartan (DIOVAN) 40 MG tablet TAKE ONE TABLET BY MOUTH DAILY 3/30/21   Yes Roosevelt Fiore MD   atorvastatin (LIPITOR) 80 MG tablet TAKE ONE TABLET BY MOUTH ONCE NIGHTLY 3/30/21   Yes Ludmila Flores MD   ticagrelor (BRILINTA) 90 MG TABS tablet Take 1 tablet by mouth 2 times daily 11/23/20   Yes Kathryn Argueta MD   metFORMIN (GLUCOPHAGE) 500 MG tablet Take 1 tablet by mouth 2 times daily (with meals) 11/2/20   Yes Hank Yanez MD   Blood Glucose Monitoring Suppl (TRUE METRIX METER) w/Device KIT Test blood sugars 3 times a day before meals 11/2/20   Yes Hank Yanez MD   blood glucose test strips (TRUE METRIX BLOOD GLUCOSE TEST) strip 1 each by In Vitro route 3 times daily As needed.  11/2/20   Yes aHnk Yanez MD   Lancet Devices (LANCING DEVICE) MISC Test blood sugars 3 times a day before meals 11/2/20   Yes Hank Ynaez MD   Lancets MISC 1 each by Does not apply route 3 times daily 11/2/20   Yes Hank Yanez MD   aspirin 81 MG chewable tablet Take 1 tablet by mouth daily 11/3/20   Yes Ludmila Flores MD   Fluticasone furoate-vilanterol (BREO ELLIPTA) 200-25 MCG/INH AEPB inhaler Inhale 1 Inhaler into the lungs daily 4/21/20   Yes Historical Provider, MD   fluticasone (FLONASE) 50 MCG/ACT nasal spray 2 sprays by Nasal route daily 10/19/20   Yes Historical Provider, MD   ipratropium-albuterol (DUONEB) 0.5-2.5 (3) MG/3ML SOLN nebulizer solution USE 3 MLS WITH NEBULIZER FOUR TIMES DAILY 4/6/20   Yes Historical Provider, MD   methocarbamol (ROBAXIN) 750 MG tablet Take 750 mg by mouth 3 times daily 9/16/20   Yes Historical Provider, MD   montelukast (SINGULAIR) 10 MG tablet Take 10 mg by mouth nightly 9/15/20   Yes Historical Provider, MD   albuterol sulfate HFA (PROAIR HFA) 108 (90 Base) MCG/ACT inhaler Inhale 2 puffs into the lungs every 6 hours as needed for Wheezing 8/12/18   Yes Najma Stewart MD            CURRENT Medications:    Current Meds Link used for Sign Out Report   No current facility-administered medications for this visit.          Allergies:  Penicillins               Review of Systems: SEE HPI   · Constitutional: no unanticipated weight loss. There's been no change in energy level, sleep pattern, or activity level. No fevers, chills. · Eyes: No visual changes or diplopia. No scleral icterus. · ENT: No Headaches, hearing loss or vertigo. No mouth sores or sore throat. · Cardiovascular: No Chest pain, tightness or discomfort. · No Shortness of breath. No Dyspnea on exertion, Orthopnea, Paroxysmal nocturnal dyspnea or breathlessness at rest.  · No Palpitations. · No Syncope ('blackouts', 'faints', 'collapse') or dizziness. · Respiratory: No cough or wheezing, no sputum production. No hematemesis. · Gastrointestinal: No abdominal pain, appetite loss, blood in stools. No change in bowel or bladder habits. · Genitourinary: No dysuria, trouble voiding, or hematuria. · Musculoskeletal:  No gait disturbance, no joint complaints. · Integumentary: No rash or pruritis. · Neurological: No headache, diplopia, change in muscle strength, numbness or tingling. · Psychiatric: No anxiety or depression. · Endocrine: No temperature intolerance. No excessive thirst, fluid intake, or urination. No tremor. · Hematologic/Lymphatic: No abnormal bruising or bleeding, blood clots or swollen lymph nodes. · Allergic/Immunologic: No nasal congestion or hives.        Objective:      PHYSICAL EXAM:      Vitals:    05/19/21 0940   BP: 112/75   Pulse: 87   Resp: 20   Temp: 97.7 °F (36.5 °C)   TempSrc: Infrared   SpO2: 98%   Weight: 173 lb (78.5 kg)   Height: 5' 1\" (1.549 m)              Vitals:     04/20/21 1151   BP: 120/80   Pulse: 72   SpO2: 98%    Weight: 178 lb 3.2 oz (80.8 kg)       General Appearance:  Alert, cooperative, no distress, appears stated age. Head:  Normocephalic, without obvious abnormality, atraumatic. Eyes:  Pupils equal and round. No scleral icterus. Mouth: Moist mucosa, no pharyngeal erythema. Nose: Nares normal. No drainage or sinus tenderness.    Neck: Supple, symmetrical, trachea midline. No adenopathy. No tenderness/mass/nodules. No carotid bruit or elevated JVD. Lungs:   Respiratory Effort: Normal   Auscultation: Clear to auscultation bilaterally, respirations unlabored. No wheeze, rales   Chest Wall:  No tenderness or deformity. Cardiovascular:     Pulses  Palpation: normal   Ascultation: Regular rate, S1/ S2 normal. No murmur, rub, or gallop. 2+ radial and pedal pulses, symmetric  Carotid  Femoral   Abdomen and Gastrointestinal:   Soft, non-tender, bowel sounds active. Liver and Spleen  Masses   Musculoskeletal: No muscle wasting  Back  Gait   Extremities: Extremities normal, atraumatic. No cyanosis or edema. No cyanosis clubbing         Skin: Inspection and palpation performed, no rashes or lesions. Pysch: Normal mood and affect.  Alert and oriented to time place person   Neurologic: Normal gross motor and sensory exam.       Labs      All labs have been reviewed           Lab Results   Component Value Date     WBC 10.5 11/02/2020     RBC 4.83 11/02/2020     HGB 14.8 11/02/2020     HCT 43.1 11/02/2020     MCV 89.2 11/02/2020     RDW 13.6 11/02/2020      11/02/2020            Lab Results   Component Value Date      11/01/2020     K 3.6 11/01/2020     CL 95 11/01/2020     CO2 21 11/01/2020     BUN 15 11/01/2020     CREATININE 0.8 11/01/2020     GFRAA >60 11/01/2020     GFRAA >60 03/28/2011     AGRATIO 1.3 08/12/2018     LABGLOM >60 11/01/2020     GLUCOSE 145 11/01/2020     PROT 7.2 08/12/2018     PROT 7.0 12/01/2010     CALCIUM 9.5 11/01/2020     BILITOT 0.3 08/12/2018     ALKPHOS 79 08/12/2018     AST 16 08/12/2018     ALT 20 08/12/2018      No results found for: PTINR        Lab Results   Component Value Date     LABA1C 7.5 10/31/2020      Lab Results   Component Value Date     TROPONINI 0.24 (H) 10/31/2020         Cardiac, Vascular and Imaging Data all Personally Reviewed in Detail by Myself       EKG:      Echocardiogram:   ECHO 10/31/2020  Normal left ventricular size and wall thickness. There is low normal to mildly reduced systolic function with basal to mid  inferior wall hypokinesis. Ejection fraction is visually estimated to be 45-50%. Grade I diastolic dysfunction with normal LV filling pressures. The right ventricle is normal in size. Right ventricular systolic function is mildly reduced. There are no significant valvular abnormalities appreciated in this study.     Stress Test:      Cath:  Cardiac Cath 10/31/2020 (Dr Lydia Robison)   Successful PTCA stenting of a subtotally occluded right  coronary artery.  The patient was chest pain free after deployment of a  3.5 mm x 15 mm Xience Abby drug-eluting stent.  No dissection or dye  extravasation was noted.  There was JIE grade III flow with nearly 0%  residual stenosis. Jobrandieephine Bonier is a 40% stenosis of obtuse marginal branch  #1.  It is not the culprit vessel.  There is JIE grade III flow there. The remaining coronary arteries appear to be essentially normal.  LV  ejection fraction 40%, inferior wall hypokinesia.     Other imaging:      Assessment and Plan      Coronary artery disease, unstable angina  Intermittent chest discomfort waking from sleep. Will order chemical stress test to assess. Continue Asa, Brilinta, B-blocker and statin therapy. Encouraged regular walking program.     I had a detail discussion with the patient and the family members regarding the risk and benefit of the procedures. I explained to them the details of the procedure. I explained to them that the procedure will be performed using moderate sedation and local anaesthesia using lidocaine. I explained any risk of bleeding, perforation of the vessel, stroke, myocardial infarction or death. Also explained to the patient need for any emergent open heart surgery and CABG to save the patient's life.    The patient and the family members understood the risk and benefits and the details of procedure and would like to go ahead with the procedure. The patient gave informed consent.     Hyperlipidemia  Controlled. Continue high intensity statin therapy. Recommend that she start fish oil supplement. Repeat lipid profile in 3 months. . If triglycerides do not improve will consider Vascepa.      Diabetes  Managed by PCP.     COPD  Managed by PCP.      Follow up in 6 months.      Thank you for allowing us to participate in the care of Alysha Yap Cruz.   Please do not hesitate to contact me if you have any questions.     Rodney Nguyen MD, MPH     [facility]

## 2021-05-20 NOTE — PROGRESS NOTES
I called and spoke with Lorrin Fothergill as follow up from her PCI yesterday. She states she is doing well and her right radial site is fine.

## 2021-06-25 ENCOUNTER — OFFICE VISIT (OUTPATIENT)
Dept: CARDIOLOGY CLINIC | Age: 61
End: 2021-06-25
Payer: COMMERCIAL

## 2021-06-25 VITALS
WEIGHT: 170.6 LBS | HEIGHT: 61 IN | OXYGEN SATURATION: 97 % | DIASTOLIC BLOOD PRESSURE: 62 MMHG | BODY MASS INDEX: 32.21 KG/M2 | HEART RATE: 89 BPM | SYSTOLIC BLOOD PRESSURE: 116 MMHG

## 2021-06-25 DIAGNOSIS — I25.10 CAD IN NATIVE ARTERY: Primary | ICD-10-CM

## 2021-06-25 DIAGNOSIS — E78.2 MIXED HYPERLIPIDEMIA: ICD-10-CM

## 2021-06-25 PROCEDURE — 99213 OFFICE O/P EST LOW 20 MIN: CPT | Performed by: INTERNAL MEDICINE

## 2021-06-25 PROCEDURE — 93000 ELECTROCARDIOGRAM COMPLETE: CPT | Performed by: INTERNAL MEDICINE

## 2021-06-25 RX ORDER — FLUTICASONE FUROATE, UMECLIDINIUM BROMIDE AND VILANTEROL TRIFENATATE 200; 62.5; 25 UG/1; UG/1; UG/1
1 POWDER RESPIRATORY (INHALATION) DAILY
COMMUNITY
Start: 2021-05-25

## 2021-06-25 RX ORDER — CLOPIDOGREL BISULFATE 75 MG/1
75 TABLET ORAL DAILY
Qty: 90 TABLET | Refills: 3 | Status: SHIPPED | OUTPATIENT
Start: 2021-06-25 | End: 2022-05-12 | Stop reason: ALTCHOICE

## 2021-06-25 NOTE — PROGRESS NOTES
[facility]  Cardiology Consult    Jerrell Valenzuela  1960 June 25, 2021      Reason for Referral: Coronary artery disease    CC: \"I still have shortness of breath. \"      Subjective:     History of Present Illness:    Jerrell Valenzuela is a 63 y.o. patient with a PMH significant for tobacco use, COPD, and hyperlipidemia. She presented to [facility] emergency room on 10/31/2020 with complaints of chest pain and ECG showed inferior ST elevation. She was taken to the cath lab and underwent placement of stent to occluded RCA  Today, she is here for follow up s/p PCI. She states that she continues to have shortness of breath with exertion. She has been having frequent nosebleeds. Patient denies exertional chest pain/pressure, dyspnea at restPND, orthopnea, palpitations, lightheadedness, weight changes, changes in LE edema, and syncope. Past Medical History:   has a past medical history of Asthma, Elevated C-reactive protein (CRP), Hemorrhoid, Hyperlipidemia, Hypertriglyceridemia, Menopause, Osteoarthritis, and Overweight(278.02). Surgical History:   has a past surgical history that includes Tubal ligation (5791) and Diagnostic Cardiac Cath Lab Procedure. Social History:   reports that she has quit smoking. Her smoking use included cigarettes. She has a 37.00 pack-year smoking history. She has never used smokeless tobacco. She reports that she does not drink alcohol and does not use drugs. Family History:  family history includes Cancer (age of onset: 25) in her brother; Heart Disease in her paternal grandmother; Heart Disease (age of onset: 21) in her brother. Home Medications:  Were reviewed and are listed in nursing record and/or below  Prior to Admission medications    Medication Sig Start Date End Date Taking?  Authorizing Provider   Fluticasone-Umeclidin-Vilant (TRELEGY ELLIPTA) 200-62.5-25 MCG/INH AEPB Inhale 1 puff into the lungs daily 5/25/21  Yes Historical Provider, MD metoprolol succinate (TOPROL XL) 25 MG extended release tablet TAKE ONE TABLET BY MOUTH DAILY 3/30/21  Yes Roosevelt Fiore MD   valsartan (DIOVAN) 40 MG tablet TAKE ONE TABLET BY MOUTH DAILY 3/30/21  Yes Roosevelt Fiore MD   atorvastatin (LIPITOR) 80 MG tablet TAKE ONE TABLET BY MOUTH ONCE NIGHTLY 3/30/21  Yes Roosevelt Fiore MD   metFORMIN (GLUCOPHAGE) 500 MG tablet Take 1 tablet by mouth 2 times daily (with meals) 11/2/20  Yes Prince Sanchez MD   Blood Glucose Monitoring Suppl (TRUE METRIX METER) w/Device KIT Test blood sugars 3 times a day before meals 11/2/20  Yes Prince Sanchez MD   blood glucose test strips (TRUE METRIX BLOOD GLUCOSE TEST) strip 1 each by In Vitro route 3 times daily As needed.  11/2/20  Yes Prince Sanchez MD   Lancet Devices (LANCING DEVICE) MISC Test blood sugars 3 times a day before meals 11/2/20  Yes Prince Sanchez MD   Lancets MISC 1 each by Does not apply route 3 times daily 11/2/20  Yes Prince Sanchez MD   aspirin 81 MG chewable tablet Take 1 tablet by mouth daily 11/3/20  Yes Roosevelt Fiore MD   Fluticasone furoate-vilanterol (BREO ELLIPTA) 200-25 MCG/INH AEPB inhaler Inhale 1 Inhaler into the lungs daily 4/21/20  Yes Historical Provider, MD   fluticasone (FLONASE) 50 MCG/ACT nasal spray 2 sprays by Nasal route daily 10/19/20  Yes Historical Provider, MD   ipratropium-albuterol (DUONEB) 0.5-2.5 (3) MG/3ML SOLN nebulizer solution USE 3 MLS WITH NEBULIZER FOUR TIMES DAILY 4/6/20  Yes Historical Provider, MD   methocarbamol (ROBAXIN) 750 MG tablet Take 750 mg by mouth 3 times daily 9/16/20  Yes Historical Provider, MD   montelukast (SINGULAIR) 10 MG tablet Take 10 mg by mouth nightly 9/15/20  Yes Historical Provider, MD   albuterol sulfate HFA (PROAIR HFA) 108 (90 Base) MCG/ACT inhaler Inhale 2 puffs into the lungs every 6 hours as needed for Wheezing 8/12/18  Yes Robert Smith MD        CURRENT Medications:  No current facility-administered medications for this visit. Allergies:  Penicillins           Review of Systems: SEE HPI   · Constitutional: no unanticipated weight loss. There's been no change in energy level, sleep pattern, or activity level. No fevers, chills. · Eyes: No visual changes or diplopia. No scleral icterus. · ENT: No Headaches, hearing loss or vertigo. No mouth sores or sore throat. · Cardiovascular: No Chest pain, tightness or discomfort.  No Shortness of breath. No Dyspnea on exertion, Orthopnea, Paroxysmal nocturnal dyspnea or breathlessness at rest.   No Palpitations.  No Syncope ('blackouts', 'faints', 'collapse') or dizziness. · Respiratory: No cough or wheezing, no sputum production. No hematemesis. · Gastrointestinal: No abdominal pain, appetite loss, blood in stools. No change in bowel or bladder habits. · Genitourinary: No dysuria, trouble voiding, or hematuria. · Musculoskeletal:  No gait disturbance, no joint complaints. · Integumentary: No rash or pruritis. · Neurological: No headache, diplopia, change in muscle strength, numbness or tingling. · Psychiatric: No anxiety or depression. · Endocrine: No temperature intolerance. No excessive thirst, fluid intake, or urination. No tremor. · Hematologic/Lymphatic: No abnormal bruising or bleeding, blood clots or swollen lymph nodes. · Allergic/Immunologic: No nasal congestion or hives. Objective:     PHYSICAL EXAM:      Vitals:    06/25/21 1442   BP: 116/62   Pulse: 89   SpO2: 97%    Weight: 170 lb 9.6 oz (77.4 kg)       General Appearance:  Alert, cooperative, no distress, appears stated age. Head:  Normocephalic, without obvious abnormality, atraumatic. Eyes:  Pupils equal and round. No scleral icterus. Mouth: Moist mucosa, no pharyngeal erythema. Nose: Nares normal. No drainage or sinus tenderness. Neck: Supple, symmetrical, trachea midline. No adenopathy. No tenderness/mass/nodules. No carotid bruit or elevated JVD.    Lungs:   Respiratory Effort: visually estimated to be 45-50%. Grade I diastolic dysfunction with normal LV filling pressures. The right ventricle is normal in size. Right ventricular systolic function is mildly reduced. There are no significant valvular abnormalities appreciated in this study. Stress Test:   Stress test 5/7/21   Suboptimal image quality secondary to bowel artifact.   Ursula Musa is a large sized, moderate to severe intensity, mostly fixed inferior    wall defect. Cannot exclude ischemia to the inferior wall secondary to bowel    artifact. Otherwise no evidence of reversible ischemia in the other    territories.    Normal LV size and systolic function. Cath:  Cardiac Cath 10/31/2020 (Dr Talisha Chin)   Successful PTCA stenting of a subtotally occluded right  coronary artery. The patient was chest pain free after deployment of a  3.5 mm x 15 mm Xience Abby drug-eluting stent. No dissection or dye  extravasation was noted. There was JIE grade III flow with nearly 0%  residual stenosis. There is a 40% stenosis of obtuse marginal branch  #1. It is not the culprit vessel. There is JIE grade III flow there. The remaining coronary arteries appear to be essentially normal.  LV  ejection fraction 40%, inferior wall hypokinesia. Cardiac cath 5/19/2021  ANGIOGRAPHY FINDINGS:  1. Left main normal.  2.  Left anterior descending artery is normal, JIE 3 flow, no stenosis. 3.  Left circumflex patent, JIE 3 flow, no stenosis. 4.  Right coronary artery comes from right coronary cusp. This is a  right dominant system. Distally, there is an older stent. There is an  edge dissection with 70% stenosis. 5.  LV ejection fraction 60%.     SUMMARY:  Successful revascularization of right coronary artery,  placement of one drug-coated stent, 4.0 x 15 post dilating up to 4.5 mm.     Other imaging:     Assessment and Plan     Coronary artery disease  S/p Successful revascularization of right coronary artery,  placement of one

## 2021-10-28 NOTE — PROGRESS NOTES
5/25/21  Yes Historical Provider, MD   clopidogrel (PLAVIX) 75 MG tablet Take 1 tablet by mouth daily 6/25/21  Yes Brennon Low MD   metoprolol succinate (TOPROL XL) 25 MG extended release tablet TAKE ONE TABLET BY MOUTH DAILY 3/30/21  Yes Brennon Low MD   valsartan (DIOVAN) 40 MG tablet TAKE ONE TABLET BY MOUTH DAILY 3/30/21  Yes Brennon Low MD   atorvastatin (LIPITOR) 80 MG tablet TAKE ONE TABLET BY MOUTH ONCE NIGHTLY 3/30/21  Yes Brennon Low MD   metFORMIN (GLUCOPHAGE) 500 MG tablet Take 1 tablet by mouth 2 times daily (with meals) 11/2/20  Yes Marcelina Solorzano MD   Blood Glucose Monitoring Suppl (TRUE METRIX METER) w/Device KIT Test blood sugars 3 times a day before meals 11/2/20  Yes Marcelina Solorzano MD   blood glucose test strips (TRUE METRIX BLOOD GLUCOSE TEST) strip 1 each by In Vitro route 3 times daily As needed. 11/2/20  Yes Marcelina Solorzano MD   Lancet Devices (LANCING DEVICE) MISC Test blood sugars 3 times a day before meals 11/2/20  Yes Marcelina Solorzano MD   Lancets MISC 1 each by Does not apply route 3 times daily 11/2/20  Yes Marcelina Solorzano MD   aspirin 81 MG chewable tablet Take 1 tablet by mouth daily 11/3/20  Yes Brennon Low MD   fluticasone (FLONASE) 50 MCG/ACT nasal spray 2 sprays by Nasal route daily 10/19/20  Yes Historical Provider, MD   ipratropium-albuterol (DUONEB) 0.5-2.5 (3) MG/3ML SOLN nebulizer solution USE 3 MLS WITH NEBULIZER FOUR TIMES DAILY 4/6/20  Yes Historical Provider, MD   methocarbamol (ROBAXIN) 750 MG tablet Take 750 mg by mouth 3 times daily 9/16/20  Yes Historical Provider, MD   montelukast (SINGULAIR) 10 MG tablet Take 10 mg by mouth nightly 9/15/20  Yes Historical Provider, MD   albuterol sulfate HFA (PROAIR HFA) 108 (90 Base) MCG/ACT inhaler Inhale 2 puffs into the lungs every 6 hours as needed for Wheezing 8/12/18  Yes Megha Nolan MD        CURRENT Medications:  No current facility-administered medications for this visit.       Allergies:  Penicillins Review of Systems: SEE HPI   · Constitutional: no unanticipated weight loss. There's been no change in energy level, sleep pattern, or activity level. No fevers, chills. · Eyes: No visual changes or diplopia. No scleral icterus. · ENT: No Headaches, hearing loss or vertigo. No mouth sores or sore throat. · Cardiovascular: No Chest pain, tightness or discomfort.  No Shortness of breath. No Dyspnea on exertion, Orthopnea, Paroxysmal nocturnal dyspnea or breathlessness at rest.   No Palpitations.  No Syncope ('blackouts', 'faints', 'collapse') or dizziness. · Respiratory: No cough or wheezing, no sputum production. No hematemesis. · Gastrointestinal: No abdominal pain, appetite loss, blood in stools. No change in bowel or bladder habits. · Genitourinary: No dysuria, trouble voiding, or hematuria. · Musculoskeletal:  No gait disturbance, no joint complaints. · Integumentary: No rash or pruritis. · Neurological: No headache, diplopia, change in muscle strength, numbness or tingling. · Psychiatric: No anxiety or depression. · Endocrine: No temperature intolerance. No excessive thirst, fluid intake, or urination. No tremor. · Hematologic/Lymphatic: No abnormal bruising or bleeding, blood clots or swollen lymph nodes. · Allergic/Immunologic: No nasal congestion or hives. Objective:     PHYSICAL EXAM:      Vitals:    10/29/21 1125   BP: 126/66   Pulse: 90   SpO2: 97%    Weight: 169 lb (76.7 kg)       General Appearance:  Alert, cooperative, no distress, appears stated age. Head:  Normocephalic, without obvious abnormality, atraumatic. Eyes:  Pupils equal and round. No scleral icterus. Mouth: Moist mucosa, no pharyngeal erythema. Nose: Nares normal. No drainage or sinus tenderness. Neck: Supple, symmetrical, trachea midline. No adenopathy. No tenderness/mass/nodules. No carotid bruit or elevated JVD.    Lungs:   Respiratory Effort: Normal   Auscultation: Clear to auscultation bilaterally, respirations unlabored. No wheeze, rales   Chest Wall:  No tenderness or deformity. Cardiovascular:    Pulses  Palpation: normal   Ascultation: Regular rate, S1/ S2 normal. No murmur, rub, or gallop. 2+ radial and pedal pulses, symmetric  Carotid  Femoral   Abdomen and Gastrointestinal:   Soft, non-tender, bowel sounds active. Liver and Spleen  Masses   Musculoskeletal: No muscle wasting  Back  Gait   Extremities: Extremities normal, atraumatic. No cyanosis or edema. No cyanosis clubbing       Skin: Inspection and palpation performed, no rashes or lesions. Pysch: Normal mood and affect. Alert and oriented to time place person   Neurologic: Normal gross motor and sensory exam.       Labs     All labs have been reviewed    Lab Results   Component Value Date    WBC 9.5 05/18/2021    RBC 4.32 05/18/2021    HGB 13.4 05/18/2021    HCT 40.3 05/18/2021    MCV 93.3 05/18/2021    RDW 13.8 05/18/2021     05/18/2021     Lab Results   Component Value Date     05/18/2021    K 4.5 05/18/2021    K 3.6 11/01/2020     05/18/2021    CO2 24 05/18/2021    BUN 13 05/18/2021    CREATININE 0.8 05/18/2021    GFRAA >60 05/18/2021    GFRAA >60 03/28/2011    AGRATIO 1.3 08/12/2018    LABGLOM >60 05/18/2021    GLUCOSE 107 05/18/2021    PROT 7.2 08/12/2018    PROT 7.0 12/01/2010    CALCIUM 9.7 05/18/2021    BILITOT 0.3 08/12/2018    ALKPHOS 79 08/12/2018    AST 16 08/12/2018    ALT 20 08/12/2018     No results found for: PTINR  Lab Results   Component Value Date    LABA1C 7.5 10/31/2020     Lab Results   Component Value Date    TROPONINI 0.24 (H) 10/31/2020       Cardiac, Vascular and Imaging Data all Personally Reviewed in Detail by Myself      EKG:     Echocardiogram:   ECHO 10/31/2020  Normal left ventricular size and wall thickness. There is low normal to mildly reduced systolic function with basal to mid  inferior wall hypokinesis. Ejection fraction is visually estimated to be 45-50%.   Grade I diastolic dysfunction with normal LV filling pressures. The right ventricle is normal in size. Right ventricular systolic function is mildly reduced. There are no significant valvular abnormalities appreciated in this study. Stress Test:   Stress test 5/7/21   Suboptimal image quality secondary to bowel artifact.   Mauri Duff is a large sized, moderate to severe intensity, mostly fixed inferior    wall defect. Cannot exclude ischemia to the inferior wall secondary to bowel    artifact. Otherwise no evidence of reversible ischemia in the other    territories.    Normal LV size and systolic function. Cath:  Cardiac Cath 10/31/2020 (Dr Wes Kc)   Successful PTCA stenting of a subtotally occluded right  coronary artery. The patient was chest pain free after deployment of a  3.5 mm x 15 mm Xience Abby drug-eluting stent. No dissection or dye  extravasation was noted. There was JIE grade III flow with nearly 0%  residual stenosis. There is a 40% stenosis of obtuse marginal branch  #1. It is not the culprit vessel. There is JIE grade III flow there. The remaining coronary arteries appear to be essentially normal.  LV  ejection fraction 40%, inferior wall hypokinesia. Cardiac cath 5/19/2021  ANGIOGRAPHY FINDINGS:  1. Left main normal.  2.  Left anterior descending artery is normal, JIE 3 flow, no stenosis. 3.  Left circumflex patent, JIE 3 flow, no stenosis. 4.  Right coronary artery comes from right coronary cusp. This is a  right dominant system. Distally, there is an older stent. There is an  edge dissection with 70% stenosis. 5.  LV ejection fraction 60%.     SUMMARY:  Successful revascularization of right coronary artery,  placement of one drug-coated stent, 4.0 x 15 post dilating up to 4.5 mm.     Other imaging:     Assessment and Plan     Coronary artery disease  S/p Successful revascularization of right coronary artery, placement of one drug-coated stent, 4.0 x 15 post dilating up to 4.5 mm.  Isolated episode of pain between shoulder blades. No recurrence. I have asked her to call the office with recurrent episodes and will order a nuclear stress test if this reoccurs. Continue Asa, Plavix, B-blocker and statin therapy. Encouraged regular walking program.     Hyperlipidemia  Continue high intensity statin therapy. Diabetes  Managed by PCP. COPD  Managed by PCP. Follow up in 6 months. Thank you for allowing us to participate in the care of [address]. Please do not hesitate to contact me if you have any questions. Ghazal Dueñas MD, MPH    [facility], [address] [place] [address] [address]  Ph: [phone]  Fax: [phone]    This note was scribed in the presence of Dr Ivy Burnham, by Okmulgee Alturas RN  Physician Attestation:  The scribes documentation has been prepared under my direction and personally reviewed by me in its entirety. I confirm that the note above accurately reflects all work, treatment, procedures, and medical decision making performed by me.

## 2021-10-29 ENCOUNTER — OFFICE VISIT (OUTPATIENT)
Dept: CARDIOLOGY CLINIC | Age: 61
End: 2021-10-29
Payer: COMMERCIAL

## 2021-10-29 VITALS
HEIGHT: 61 IN | OXYGEN SATURATION: 97 % | BODY MASS INDEX: 31.91 KG/M2 | HEART RATE: 90 BPM | DIASTOLIC BLOOD PRESSURE: 66 MMHG | WEIGHT: 169 LBS | SYSTOLIC BLOOD PRESSURE: 126 MMHG

## 2021-10-29 DIAGNOSIS — I25.10 CAD IN NATIVE ARTERY: Primary | ICD-10-CM

## 2021-10-29 DIAGNOSIS — E78.2 MIXED HYPERLIPIDEMIA: ICD-10-CM

## 2021-10-29 PROCEDURE — 99214 OFFICE O/P EST MOD 30 MIN: CPT | Performed by: INTERNAL MEDICINE

## 2021-10-29 NOTE — PATIENT INSTRUCTIONS

## 2021-11-15 ENCOUNTER — CLINICAL DOCUMENTATION (OUTPATIENT)
Dept: OTHER | Age: 61
End: 2021-11-15

## 2022-03-28 RX ORDER — ATORVASTATIN CALCIUM 80 MG/1
TABLET, FILM COATED ORAL
Qty: 30 TABLET | Refills: 3 | Status: SHIPPED | OUTPATIENT
Start: 2022-03-28 | End: 2022-07-18 | Stop reason: SDUPTHER

## 2022-03-28 RX ORDER — VALSARTAN 40 MG/1
TABLET ORAL
Qty: 30 TABLET | Refills: 3 | Status: SHIPPED | OUTPATIENT
Start: 2022-03-28 | End: 2022-07-21 | Stop reason: SDUPTHER

## 2022-03-28 RX ORDER — METOPROLOL SUCCINATE 25 MG/1
TABLET, EXTENDED RELEASE ORAL
Qty: 30 TABLET | Refills: 3 | Status: SHIPPED | OUTPATIENT
Start: 2022-03-28 | End: 2022-07-21 | Stop reason: SDUPTHER

## 2022-03-28 NOTE — TELEPHONE ENCOUNTER
Last ov 10/29/21  Pending appt due in april  Last refill all meds 3/30/21 #90x3  Last labs 5/7/21 lipid, 5/18/21 other meds

## 2022-05-09 NOTE — PROGRESS NOTES
[facility]  Cardiology Consult    Kye Red  1960    May 9, 2022      Reason for Referral: Coronary artery disease    CC: \"I am good. \"      Subjective:     History of Present Illness:    Kye Red is a 63 y.o. patient with a PMH significant for tobacco use, COPD, and hyperlipidemia. She presented to [facility] emergency room on 10/31/2020 with complaints of chest pain and ECG showed inferior ST elevation. She was taken to the cath lab and underwent placement of stent to occluded RCA  Today, she is here for follow up. She states that she has not been taking her Plavix. She is not sure why. She is agreeable to start the Plavix. Patient denies exertional chest pain/pressure, dyspnea at rest, AIVLES, PND, orthopnea, palpitations, lightheadedness, weight changes, changes in LE edema, and syncope. Past Medical History:   has a past medical history of Asthma, Elevated C-reactive protein (CRP), Hemorrhoid, Hyperlipidemia, Hypertriglyceridemia, Menopause, Osteoarthritis, and Overweight(278.02). Surgical History:   has a past surgical history that includes Tubal ligation (7006) and Diagnostic Cardiac Cath Lab Procedure. Social History:   reports that she has quit smoking. Her smoking use included cigarettes. She has a 37.00 pack-year smoking history. She has never used smokeless tobacco. She reports that she does not drink alcohol and does not use drugs. Family History:  family history includes Cancer (age of onset: 25) in her brother; Heart Disease in her paternal grandmother; Heart Disease (age of onset: 21) in her brother. Home Medications:  Were reviewed and are listed in nursing record and/or below  Prior to Admission medications    Medication Sig Start Date End Date Taking?  Authorizing Provider   atorvastatin (LIPITOR) 80 MG tablet TAKE ONE TABLET BY MOUTH ONCE NIGHTLY 3/28/22   BEATRIZ Askew CNP   metoprolol succinate (TOPROL XL) 25 MG extended release tablet TAKE ONE TABLET BY MOUTH DAILY 3/28/22   BEATRIZ Vargas CNP   valsartan (DIOVAN) 40 MG tablet TAKE ONE TABLET BY MOUTH DAILY 3/28/22   BEATRIZ Vargas CNP   Fluticasone-Umeclidin-Vilant (TRELEGY ELLIPTA) 200-62.5-25 MCG/INH AEPB Inhale 1 puff into the lungs daily 5/25/21   Historical Provider, MD   clopidogrel (PLAVIX) 75 MG tablet Take 1 tablet by mouth daily 6/25/21   Rl Royal MD   metFORMIN (GLUCOPHAGE) 500 MG tablet Take 1 tablet by mouth 2 times daily (with meals) 11/2/20   Winnie Webster MD   Blood Glucose Monitoring Suppl (TRUE METRIX METER) w/Device KIT Test blood sugars 3 times a day before meals 11/2/20   Winnie Webster MD   blood glucose test strips (TRUE METRIX BLOOD GLUCOSE TEST) strip 1 each by In Vitro route 3 times daily As needed. 11/2/20   Winnie Webster MD   Lancet Devices (LANCING DEVICE) MISC Test blood sugars 3 times a day before meals 11/2/20   Winnie Webster MD   Lancets MISC 1 each by Does not apply route 3 times daily 11/2/20   Winnie Webster MD   aspirin 81 MG chewable tablet Take 1 tablet by mouth daily 11/3/20   Rl Royal MD   fluticasone [facility]) 50 MCG/ACT nasal spray 2 sprays by Nasal route daily 10/19/20   Historical Provider, MD   ipratropium-albuterol (DUONEB) 0.5-2.5 (3) MG/3ML SOLN nebulizer solution USE 3 MLS WITH NEBULIZER FOUR TIMES DAILY 4/6/20   Historical Provider, MD   methocarbamol (ROBAXIN) 750 MG tablet Take 750 mg by mouth 3 times daily 9/16/20   Historical Provider, MD   montelukast (SINGULAIR) 10 MG tablet Take 10 mg by mouth nightly 9/15/20   Historical Provider, MD   albuterol sulfate HFA (PROAIR HFA) 108 (90 Base) MCG/ACT inhaler Inhale 2 puffs into the lungs every 6 hours as needed for Wheezing 8/12/18   Marcellus Herrera MD        CURRENT Medications:  No current facility-administered medications for this visit. Allergies:  Penicillins           Review of Systems: SEE HPI   · Constitutional: no unanticipated weight loss. There's been no change in energy level, sleep pattern, or activity level. No fevers, chills. · Eyes: No visual changes or diplopia. No scleral icterus. · ENT: No Headaches, hearing loss or vertigo. No mouth sores or sore throat. · Cardiovascular: No Chest pain, tightness or discomfort.  No Shortness of breath. No Dyspnea on exertion, Orthopnea, Paroxysmal nocturnal dyspnea or breathlessness at rest.   No Palpitations.  No Syncope ('blackouts', 'faints', 'collapse') or dizziness. · Respiratory: No cough or wheezing, no sputum production. No hematemesis. · Gastrointestinal: No abdominal pain, appetite loss, blood in stools. No change in bowel or bladder habits. · Genitourinary: No dysuria, trouble voiding, or hematuria. · Musculoskeletal:  No gait disturbance, no joint complaints. · Integumentary: No rash or pruritis. · Neurological: No headache, diplopia, change in muscle strength, numbness or tingling. · Psychiatric: No anxiety or depression. · Endocrine: No temperature intolerance. No excessive thirst, fluid intake, or urination. No tremor. · Hematologic/Lymphatic: No abnormal bruising or bleeding, blood clots or swollen lymph nodes. · Allergic/Immunologic: No nasal congestion or hives. Objective:     PHYSICAL EXAM:      There were no vitals filed for this visit. General Appearance:  Alert, cooperative, no distress, appears stated age. Head:  Normocephalic, without obvious abnormality, atraumatic. Eyes:  Pupils equal and round. No scleral icterus. Mouth: Moist mucosa, no pharyngeal erythema. Nose: Nares normal. No drainage or sinus tenderness. Neck: Supple, symmetrical, trachea midline. No adenopathy. No tenderness/mass/nodules. No carotid bruit or elevated JVD. Lungs:   Respiratory Effort: Normal   Auscultation: Clear to auscultation bilaterally, respirations unlabored. No wheeze, rales   Chest Wall:  No tenderness or deformity.    Cardiovascular:    Pulses Palpation: normal   Ascultation: Regular rate, S1/ S2 normal. No murmur, rub, or gallop. 2+ radial and pedal pulses, symmetric  Carotid  Femoral   Abdomen and Gastrointestinal:   Soft, non-tender, bowel sounds active. Liver and Spleen  Masses   Musculoskeletal: No muscle wasting  Back  Gait   Extremities: Extremities normal, atraumatic. No cyanosis or edema. No cyanosis clubbing       Skin: Inspection and palpation performed, no rashes or lesions. Pysch: Normal mood and affect. Alert and oriented to time place person   Neurologic: Normal gross motor and sensory exam.       Labs     All labs have been reviewed    Lab Results   Component Value Date    WBC 9.5 05/18/2021    RBC 4.32 05/18/2021    HGB 13.4 05/18/2021    HCT 40.3 05/18/2021    MCV 93.3 05/18/2021    RDW 13.8 05/18/2021     05/18/2021     Lab Results   Component Value Date     05/18/2021    K 4.5 05/18/2021    K 3.6 11/01/2020     05/18/2021    CO2 24 05/18/2021    BUN 13 05/18/2021    CREATININE 0.8 05/18/2021    GFRAA >60 05/18/2021    GFRAA >60 03/28/2011    AGRATIO 1.3 08/12/2018    LABGLOM >60 05/18/2021    GLUCOSE 107 05/18/2021    PROT 7.2 08/12/2018    PROT 7.0 12/01/2010    CALCIUM 9.7 05/18/2021    BILITOT 0.3 08/12/2018    ALKPHOS 79 08/12/2018    AST 16 08/12/2018    ALT 20 08/12/2018     No results found for: PTINR  Lab Results   Component Value Date    LABA1C 7.5 10/31/2020     Lab Results   Component Value Date    TROPONINI 0.24 (H) 10/31/2020       Cardiac, Vascular and Imaging Data all Personally Reviewed in Detail by Myself      EKG:     Echocardiogram:   ECHO 10/31/2020  Normal left ventricular size and wall thickness. There is low normal to mildly reduced systolic function with basal to mid  inferior wall hypokinesis. Ejection fraction is visually estimated to be 45-50%. Grade I diastolic dysfunction with normal LV filling pressures. The right ventricle is normal in size.   Right ventricular systolic function is mildly reduced. There are no significant valvular abnormalities appreciated in this study. Stress Test:   Stress test 5/7/21   Suboptimal image quality secondary to bowel artifact.   Elvin [place] is a large sized, moderate to severe intensity, mostly fixed inferior    wall defect. Cannot exclude ischemia to the inferior wall secondary to bowel    artifact. Otherwise no evidence of reversible ischemia in the other    territories.    Normal LV size and systolic function. Cath:  Cardiac Cath 10/31/2020 (Dr Chela Rizzo)   Successful PTCA stenting of a subtotally occluded right  coronary artery. The patient was chest pain free after deployment of a  3.5 mm x 15 mm Xience Abby drug-eluting stent. No dissection or dye  extravasation was noted. There was JIE grade III flow with nearly 0%  residual stenosis. There is a 40% stenosis of obtuse marginal branch  #1. It is not the culprit vessel. There is JIE grade III flow there. The remaining coronary arteries appear to be essentially normal.  LV  ejection fraction 40%, inferior wall hypokinesia. Cardiac cath 5/19/2021  ANGIOGRAPHY FINDINGS:  1. Left main normal.  2.  Left anterior descending artery is normal, JIE 3 flow, no stenosis. 3.  Left circumflex patent, JIE 3 flow, no stenosis. 4.  Right coronary artery comes from right coronary cusp. This is a  right dominant system. Distally, there is an older stent. There is an  edge dissection with 70% stenosis. 5.  LV ejection fraction 60%.     SUMMARY:  Successful revascularization of right coronary artery,  placement of one drug-coated stent, 4.0 x 15 post dilating up to 4.5 mm. Other imaging:     Assessment and Plan     Coronary artery disease  S/p Successful revascularization of right coronary artery, placement of one drug-coated stent, 4.0 x 15 post dilating up to 4.5 mm. Asymptomatic. Continue Asa, Plavix, B-blocker and statin therapy.  Encouraged regular walking program. Encouraged medication compliance. Hyperlipidemia  Continue high intensity statin therapy. Diabetes  Managed by PCP. COPD  Managed by PCP. Follow up in 8 months. Thank you for allowing us to participate in the care of [address]. Please do not hesitate to contact me if you have any questions. Guillaume Chowdhury MD, MPH    [place] [address]  Ph: [phone]  Fax: [phone]    This note was scribed in the presence of Dr Dayton Matias, by Shalom Joyce RN. Physician Attestation:  The scribes documentation has been prepared under my direction and personally reviewed by me in its entirety. I confirm that the note above accurately reflects all work, treatment, procedures, and medical decision making performed by me.

## 2022-05-12 ENCOUNTER — OFFICE VISIT (OUTPATIENT)
Dept: CARDIOLOGY CLINIC | Age: 62
End: 2022-05-12
Payer: COMMERCIAL

## 2022-05-12 VITALS
DIASTOLIC BLOOD PRESSURE: 62 MMHG | OXYGEN SATURATION: 97 % | BODY MASS INDEX: 32.06 KG/M2 | HEIGHT: 61 IN | SYSTOLIC BLOOD PRESSURE: 110 MMHG | WEIGHT: 169.8 LBS | HEART RATE: 88 BPM

## 2022-05-12 DIAGNOSIS — E78.2 MIXED HYPERLIPIDEMIA: ICD-10-CM

## 2022-05-12 DIAGNOSIS — I25.10 CAD IN NATIVE ARTERY: Primary | ICD-10-CM

## 2022-05-12 PROCEDURE — 99214 OFFICE O/P EST MOD 30 MIN: CPT | Performed by: INTERNAL MEDICINE

## 2022-05-12 PROCEDURE — 93000 ELECTROCARDIOGRAM COMPLETE: CPT | Performed by: INTERNAL MEDICINE

## 2022-05-12 RX ORDER — CLOPIDOGREL BISULFATE 75 MG/1
75 TABLET ORAL DAILY
Qty: 90 TABLET | Refills: 3 | Status: SHIPPED | OUTPATIENT
Start: 2022-05-12

## 2022-05-12 RX ORDER — HYOSCYAMINE SULFATE 0.125 MG
125 TABLET ORAL 3 TIMES DAILY
COMMUNITY
End: 2022-07-18 | Stop reason: SDUPTHER

## 2022-05-12 NOTE — PATIENT INSTRUCTIONS
Patient Education        Learning About Coronary Artery Disease (CAD)  What is coronary artery disease? Coronary artery disease is a condition that occurs when plaque builds up in the arteries that bring oxygen-rich blood to your heart. Plaque is a fatty substance made of cholesterol, calcium, and other substances in the blood. Thisprocess is called hardening of the arteries, or atherosclerosis. What happens when you have coronary artery disease?  Plaque may narrow the coronary arteries. Narrowed arteries cause poor blood flow. This can lead to angina symptoms such as chest pain or discomfort. If blood flow is completely blocked, you could have a heart attack.  You can slow and reduce the risk of future problems by making changes in your lifestyle. These include quitting smoking and eating heart-healthy foods.  Treatment, along with changes in your lifestyle, can help you live a longer and healthier life. How can you prevent coronary artery disease?  Do not smoke. It may be the best thing you can do to prevent coronary artery disease. If you need help quitting, talk to your doctor about stop-smoking programs and medicines. These can increase your chances of quitting for good.  Be active. Try to do moderate activity at least 2½ hours a week. Or try to do vigorous activity at least 1¼ hours a week. You may want to walk or try other activities, such as running, swimming, cycling, or playing tennis or team sports.  Eat heart-healthy foods. Eat more fruits and vegetables and less food that contains saturated and trans fats. Limit alcohol, sodium, and sweets.  Stay at a healthy weight. Lose weight if you need to.  Manage other health problems such as diabetes, high blood pressure, and high cholesterol. How is coronary artery disease treated?  Your doctor will suggest that you make lifestyle changes.  For example, your doctor may ask you to eat healthy foods, quit smoking, lose extra weight, and be more active.  You will take medicines that help prevent a heart attack.  Your doctor may suggest a procedure to open narrowed or blocked arteries. This is called angioplasty. Or your doctor may suggest using healthy blood vessels to create detours around narrowed or blocked arteries. This is called bypass surgery. Follow-up care is a key part of your treatment and safety. Be sure to make and go to all appointments, and call your doctor if you are having problems. It's also a good idea to know your test results and keep alist of the medicines you take. Where can you learn more? Go to [email or web address]. org and sign in to your [facility] [employer] account. Enter [phone] in the SendMe box to learn more about \"Learning About Coronary Artery Disease (CAD). \"     If you do not have an account, please click on the \"Sign Up Now\" link. Current as of: January 10, 2022               Content Version: 13.2  © 2006-2022 [employer]wise, Incorporated. Care instructions adapted under license by [facility] ([facility]). If you have questions about a medical condition or this instruction, always ask your healthcare professional. [place] [address] any warranty or liability for your use of this information.

## 2022-07-18 RX ORDER — HYOSCYAMINE SULFATE 0.125 MG
125 TABLET ORAL 3 TIMES DAILY
Qty: 180 TABLET | Refills: 3 | Status: SHIPPED | OUTPATIENT
Start: 2022-07-18

## 2022-07-18 RX ORDER — ATORVASTATIN CALCIUM 80 MG/1
TABLET, FILM COATED ORAL
Qty: 30 TABLET | Refills: 3 | Status: SHIPPED | OUTPATIENT
Start: 2022-07-18

## 2022-07-18 NOTE — TELEPHONE ENCOUNTER
Last OV:2022  Last Labs:2021  Last Refills:2022  Next Appt: Follow up in 8 months.   Last EK2022    Refill Request : Metoprolol (Toprol XL) 25MG

## 2022-07-18 NOTE — TELEPHONE ENCOUNTER
Last OV:2022  Last Labs:2021  Last Refills:2022  Next Appt: Follow up in 8 months.   Last EK2022     Refill Request : Metoprolol (Toprol XL) 25MG                           Atorvastatin 80MG

## 2022-07-21 RX ORDER — METOPROLOL SUCCINATE 25 MG/1
TABLET, EXTENDED RELEASE ORAL
Qty: 30 TABLET | Refills: 3 | Status: SHIPPED | OUTPATIENT
Start: 2022-07-21

## 2022-07-21 RX ORDER — VALSARTAN 40 MG/1
TABLET ORAL
Qty: 30 TABLET | Refills: 3 | Status: SHIPPED | OUTPATIENT
Start: 2022-07-21

## 2022-07-21 NOTE — TELEPHONE ENCOUNTER
Medication Refill    Medication needing refilled:  valsartan (DIOVAN)  metoprolol succinate (TOPROL XL)    Dosage of the medication:  40 MG tablet   25 MG extended release tablet     How are you taking this medication (QD, BID, TID, QID, PRN):  TAKE ONE TABLET BY MOUTH DAILY  TAKE ONE TABLET BY MOUTH DAILY    30 or 90 day supply called in: 30 day     When will you run out of your medication: almost out     Which Pharmacy are we sending the medication to?:    [facility] [address] [phone]   [facility], [address]   Phone:  [phone]  Fax:  [phone]

## 2022-08-25 ENCOUNTER — OFFICE VISIT (OUTPATIENT)
Dept: PULMONOLOGY | Age: 62
End: 2022-08-25
Payer: COMMERCIAL

## 2022-08-25 VITALS
HEART RATE: 88 BPM | BODY MASS INDEX: 32.66 KG/M2 | DIASTOLIC BLOOD PRESSURE: 60 MMHG | SYSTOLIC BLOOD PRESSURE: 124 MMHG | TEMPERATURE: 96.9 F | OXYGEN SATURATION: 98 % | HEIGHT: 61 IN | WEIGHT: 173 LBS | RESPIRATION RATE: 21 BRPM

## 2022-08-25 DIAGNOSIS — I25.10 CAD IN NATIVE ARTERY: ICD-10-CM

## 2022-08-25 DIAGNOSIS — R91.1 LUNG NODULE: Primary | ICD-10-CM

## 2022-08-25 DIAGNOSIS — J44.9 STAGE 3 SEVERE COPD BY GOLD CLASSIFICATION (HCC): ICD-10-CM

## 2022-08-25 PROCEDURE — 99204 OFFICE O/P NEW MOD 45 MIN: CPT | Performed by: INTERNAL MEDICINE

## 2022-08-25 NOTE — PROGRESS NOTES
Pulmonary Critical Care Consult           REASON FOR CONSULTATION:  Chief Complaint   Patient presents with    Establish Care    COPD        Consult at request of [address] [place] [address]     PCP: [address] [place] [address]        Assessment and Plan:   Diagnosis Orders   1. Lung nodule  Low Dose Chest CT-Abnormal Lung Screen Follow up      2. CAD in native artery        3. Stage 3 severe COPD by GOLD classification ([address]            Plan:  Continue Trelegy daily. Albuterol as needed. Need a CT scan to follow-up on her nodular density at the right posterior costophrenic sulcus noted on a CT scan done 2022             HISTORY OF PRESENT ILLNESS: Haven Notice is very pleasant 63y.o. year old  lady with medical history stated below significant for severe COPD with  FEV1 of 0.6, 36% of predicted which was significantly decreased from  when her FEV1 was 64% of predicted. She had repeat spirometry in the lab 2020 with FEV1 of 1.3, 45% of predicted and improvement with bronchodilator to 52% predicted, a 17% change. Of note, patient does have a brother with COPD and a son with asthma. Also her mother  at age 46 with COPD. Patient did have normal alpha-1 antitrypsin level. 2020 that showed no changes suspicious for lung cancer but did have coronary calcification and bilateral emphysema changes. stopped smoking as of 2020. Had a CT scan done 2022 results of which was as following it was done at [facility]:  Stable scarring within the right upper lobe with a few subcentimeter stable noncalcified nodules. There is a new 2.9 x 1.5 cm nodular density at the right posterior costophrenic sulcus. Given its characteristics, favor an area of rounded atelectasis or    scarring. A true nodule would be of a secondary consideration.  However, recommend follow-up anxiety,    Objective:   PHYSICAL EXAM:  Blood pressure 124/60, pulse 88, temperature 96.9 °F (36.1 °C), resp. rate 21, height 5' 1\" (1.549 m), weight 173 lb (78.5 kg), SpO2 98 %.'  Gen: No acute distress  Eyes: PERRL. No sclera icterus. No conjunctival injection. ENT: No discharge. Pharynx clear. External appearance of ears and nose normal.  Neck: Trachea midline. No obvious mass. Resp: Diminished bilaterally with no active wheezing rales or rhonchi  CV: Regular rate. Regular rhythm. No murmur or rub. No edema. GI: Non-tender. Non-distended. No hernia. Skin: Warm, dry, normal texture and turgor. No nodule on exposed extremities. Lymph: No cervical LAD. M/S: No cyanosis. No clubbing. No joint deformity. LE:  no edema   Neuro: no tremor, no focal deficit, awake and alert   Psych: intact judgement and insight. Current Outpatient Medications   Medication Sig Dispense Refill    metoprolol succinate (TOPROL XL) 25 MG extended release tablet TAKE ONE TABLET BY MOUTH DAILY 30 tablet 3    valsartan (DIOVAN) 40 MG tablet TAKE ONE TABLET BY MOUTH DAILY 30 tablet 3    hyoscyamine (ANASPAZ;LEVSIN) 125 MCG tablet Take 1 tablet by mouth in the morning, at noon, and at bedtime 180 tablet 3    atorvastatin (LIPITOR) 80 MG tablet TAKE ONE TABLET BY MOUTH ONCE NIGHTLY 30 tablet 3    clopidogrel (PLAVIX) 75 MG tablet Take 1 tablet by mouth daily 90 tablet 3    Fluticasone-Umeclidin-Vilant (TRELEGY ELLIPTA) 200-62.5-25 MCG/INH AEPB Inhale 1 puff into the lungs daily      metFORMIN (GLUCOPHAGE) 500 MG tablet Take 1 tablet by mouth 2 times daily (with meals) 60 tablet 3    Blood Glucose Monitoring Suppl (TRUE METRIX METER) w/Device KIT Test blood sugars 3 times a day before meals 1 kit 0    blood glucose test strips (TRUE METRIX BLOOD GLUCOSE TEST) strip 1 each by In Vitro route 3 times daily As needed.  100 each 3    Lancet Devices (LANCING DEVICE) MISC Test blood sugars 3 times a day before meals 1 each 0    Lancets pm    COMPARISON:  November 18, 2017    HISTORY:  ORDERING PHYSICIAN PROVIDED HISTORY: cp  TECHNOLOGIST PROVIDED HISTORY:  Technologist Provided Reason for Exam: Shortness of Breath (since friday. worsening. hx of COPD and asthma)  Acuity: Acute  Type of Encounter: Initial  Relevant Medical/Surgical History: COPD asthma smoker    FINDINGS:  The lungs are without acute focal process. There is no effusion or  pneumothorax. The cardiomediastinal silhouette is normal.  Mild degenerative  change of the thoracic spine    Impression  Stable chest without acute process. Pulmonary function testing  As above        This note was transcribed using [id number] [employer]. Please disregard any translational errors.     Antonio Stevens MD  [facility] Pulmonary, Sleep and Critical Care

## 2022-09-26 ENCOUNTER — HOSPITAL ENCOUNTER (OUTPATIENT)
Dept: CT IMAGING | Age: 62
Discharge: HOME | End: 2022-09-26
Payer: COMMERCIAL

## 2022-09-26 DIAGNOSIS — R91.1 LUNG NODULE: ICD-10-CM

## 2022-09-26 PROCEDURE — 71250 CT THORAX DX C-: CPT

## 2022-11-20 RX ORDER — ATORVASTATIN CALCIUM 80 MG/1
TABLET, FILM COATED ORAL
Qty: 30 TABLET | Refills: 3 | Status: SHIPPED | OUTPATIENT
Start: 2022-11-20

## 2022-11-20 RX ORDER — METOPROLOL SUCCINATE 25 MG/1
TABLET, EXTENDED RELEASE ORAL
Qty: 30 TABLET | Refills: 3 | Status: SHIPPED | OUTPATIENT
Start: 2022-11-20

## 2022-11-20 RX ORDER — VALSARTAN 40 MG/1
TABLET ORAL
Qty: 30 TABLET | Refills: 3 | Status: SHIPPED | OUTPATIENT
Start: 2022-11-20

## 2022-12-15 ENCOUNTER — OFFICE VISIT (OUTPATIENT)
Dept: CARDIOLOGY CLINIC | Age: 62
End: 2022-12-15
Payer: COMMERCIAL

## 2022-12-15 VITALS
SYSTOLIC BLOOD PRESSURE: 108 MMHG | HEIGHT: 61 IN | WEIGHT: 171 LBS | DIASTOLIC BLOOD PRESSURE: 60 MMHG | BODY MASS INDEX: 32.28 KG/M2 | OXYGEN SATURATION: 92 % | HEART RATE: 96 BPM

## 2022-12-15 DIAGNOSIS — I25.10 CAD IN NATIVE ARTERY: Primary | ICD-10-CM

## 2022-12-15 DIAGNOSIS — E78.2 MIXED HYPERLIPIDEMIA: ICD-10-CM

## 2022-12-15 PROCEDURE — 99213 OFFICE O/P EST LOW 20 MIN: CPT | Performed by: INTERNAL MEDICINE

## 2022-12-15 NOTE — PROGRESS NOTES
[facility]  Cardiology Consult    Yasmani Marsh  1960    December 15, 2022      Reason for Referral: Coronary artery disease    CC: \"I am feeling good. \"      Subjective:     History of Present Illness:    Yasmani Marsh is a 63 y.o. patient with a PMH significant for tobacco use, COPD, and hyperlipidemia. She presented to [facility] emergency room on 10/31/2020 with complaints of chest pain and ECG showed inferior ST elevation. She was taken to the cath lab and underwent placement of stent to occluded RCA  Today, she is here for follow up. She has been doing well. She has not been exercising as much as she should due to the weather. She has been trying to get better control of her blood sugar. Patient denies exertional chest pain/pressure, dyspnea at rest, AVILES, PND, orthopnea, palpitations, lightheadedness, weight changes, changes in LE edema, and syncope. Patient reports compliance to her medications. Past Medical History:   has a past medical history of Asthma, Elevated C-reactive protein (CRP), Hemorrhoid, Hyperlipidemia, Hypertriglyceridemia, Menopause, Osteoarthritis, and Overweight(278.02). Surgical History:   has a past surgical history that includes Tubal ligation (6736) and Diagnostic Cardiac Cath Lab Procedure. Social History:   reports that she quit smoking about 15 months ago. Her smoking use included cigarettes. She started smoking about 49 years ago. She has a 37.00 pack-year smoking history. She has never been exposed to tobacco smoke. She has never used smokeless tobacco. She reports that she does not drink alcohol and does not use drugs. Family History:  family history includes Cancer (age of onset: 25) in her brother; Heart Disease in her paternal grandmother; Heart Disease (age of onset: 21) in her brother.     Home Medications:  Were reviewed and are listed in nursing record and/or below  Prior to Admission medications    Medication Sig Start Date End Date Taking? Authorizing Provider   atorvastatin (LIPITOR) 80 MG tablet TAKE ONE TABLET BY MOUTH ONCE NIGHTLY 22  Yes Linda Freedman MD   metoprolol succinate (TOPROL XL) 25 MG extended release tablet TAKE ONE TABLET BY MOUTH DAILY 22  Yes Linda Freedman MD   valsartan (DIOVAN) 40 MG tablet TAKE ONE TABLET BY MOUTH DAILY 22  Yes Linda Freedman MD   hyoscyamine (ANASPAZ;LEVSIN) 125 MCG tablet Take 1 tablet by mouth in the morning, at noon, and at bedtime 22  Yes Linda Freedman MD   clopidogrel (PLAVIX) 75 MG tablet Take 1 tablet by mouth daily 22  Yes Linda Freedman MD   Fluticasone-Umeclidin-Vilant (TRELEGY ELLIPTA) 200-62.5-25 MCG/INH AEPB Inhale 1 puff into the lungs daily 21  Yes Historical Provider, MD   metFORMIN (GLUCOPHAGE) 500 MG tablet Take 1 tablet by mouth 2 times daily (with meals) 20  Yes Loan Thompson MD   Blood Glucose Monitoring Suppl (TRUE METRIX METER) w/Device KIT Test blood sugars 3 times a day before meals 20  Yes Loan Thompson MD   blood glucose test strips (TRUE METRIX BLOOD GLUCOSE TEST) strip 1 each by In Vitro route 3 times daily As needed.  20  Yes Loan Thompson MD   Lancet Devices (LANCING DEVICE) MISC Test blood sugars 3 times a day before meals 20  Yes Loan Thompson MD   Lancets MISC 1 each by Does not apply route 3 times daily 20  Yes Loan Thompson MD   aspirin 81 MG chewable tablet Take 1 tablet by mouth daily 11/3/20  Yes Linda Freedman MD   fluticasone (FLONASE) 50 MCG/ACT nasal spray 2 sprays by Nasal route daily 10/19/20  Yes Historical Provider, MD   ipratropium-albuterol (DUONEB) 0.5-2.5 (3) MG/3ML SOLN nebulizer solution USE 3 MLS WITH NEBULIZER FOUR TIMES DAILY 20  Yes Historical Provider, MD   methocarbamol (ROBAXIN) 750 MG tablet Take 750 mg by mouth 3 times daily 9/16/20  Yes Historical Provider, MD   albuterol sulfate HFA (PROAIR HFA) 108 (90 Base) MCG/ACT inhaler Inhale 2 puffs into the lungs every 6 hours as needed for Wheezing 8/12/18  Yes Yumi Childs MD        CURRENT Medications:  No current facility-administered medications for this visit. Allergies:  Penicillins and Seasonal           Review of Systems: SEE HPI   Constitutional: no unanticipated weight loss. There's been no change in energy level, sleep pattern, or activity level. No fevers, chills. Eyes: No visual changes or diplopia. No scleral icterus. ENT: No Headaches, hearing loss or vertigo. No mouth sores or sore throat. Cardiovascular: No Chest pain, tightness or discomfort. No Shortness of breath. No Dyspnea on exertion, Orthopnea, Paroxysmal nocturnal dyspnea or breathlessness at rest.  No Palpitations. No Syncope ('blackouts', 'faints', 'collapse') or dizziness. Respiratory: No cough or wheezing, no sputum production. No hematemesis. Gastrointestinal: No abdominal pain, appetite loss, blood in stools. No change in bowel or bladder habits. Genitourinary: No dysuria, trouble voiding, or hematuria. Musculoskeletal:  No gait disturbance, no joint complaints. Integumentary: No rash or pruritis. Neurological: No headache, diplopia, change in muscle strength, numbness or tingling. Psychiatric: No anxiety or depression. Endocrine: No temperature intolerance. No excessive thirst, fluid intake, or urination. No tremor. Hematologic/Lymphatic: No abnormal bruising or bleeding, blood clots or swollen lymph nodes. Allergic/Immunologic: No nasal congestion or hives. Objective:     PHYSICAL EXAM:      Vitals:    12/15/22 1034   BP: 108/60   Pulse: 96   SpO2: 92%    Weight: 171 lb (77.6 kg)       General Appearance:  Alert, cooperative, no distress, appears stated age. Head:  Normocephalic, without obvious abnormality, atraumatic. Eyes:  Pupils equal and round. No scleral icterus. Mouth: Moist mucosa, no pharyngeal erythema. Nose: Nares normal. No drainage or sinus tenderness. Neck: Supple, symmetrical, trachea midline.  No adenopathy. No tenderness/mass/nodules. No carotid bruit or elevated JVD. Lungs:   Respiratory Effort: Normal   Auscultation: Clear to auscultation bilaterally, respirations unlabored. No wheeze, rales   Chest Wall:  No tenderness or deformity. Cardiovascular:    Pulses  Palpation: normal   Ascultation: Regular rate, S1/ S2 normal. No murmur, rub, or gallop. 2+ radial and pedal pulses, symmetric  Carotid  Femoral   Abdomen and Gastrointestinal:   Soft, non-tender, bowel sounds active. Liver and Spleen  Masses   Musculoskeletal: No muscle wasting  Back  Gait   Extremities: Extremities normal, atraumatic. No cyanosis or edema. No cyanosis clubbing       Skin: Inspection and palpation performed, no rashes or lesions. Pysch: Normal mood and affect.  Alert and oriented to time place person   Neurologic: Normal gross motor and sensory exam.       Labs     All labs have been reviewed    Lab Results   Component Value Date/Time    WBC 9.5 05/18/2021 08:23 AM    RBC 4.32 05/18/2021 08:23 AM    HGB 13.4 05/18/2021 08:23 AM    HCT 40.3 05/18/2021 08:23 AM    MCV 93.3 05/18/2021 08:23 AM    RDW 13.8 05/18/2021 08:23 AM     05/18/2021 08:23 AM     Lab Results   Component Value Date/Time     05/18/2021 08:23 AM    K 4.5 05/18/2021 08:23 AM    K 3.6 11/01/2020 05:37 AM     05/18/2021 08:23 AM    CO2 24 05/18/2021 08:23 AM    BUN 13 05/18/2021 08:23 AM    CREATININE 0.8 05/18/2021 08:23 AM    GFRAA >60 05/18/2021 08:23 AM    GFRAA >60 03/28/2011 07:35 AM    AGRATIO 1.3 08/12/2018 11:35 AM    LABGLOM >60 05/18/2021 08:23 AM    GLUCOSE 107 05/18/2021 08:23 AM    PROT 7.2 08/12/2018 11:35 AM    PROT 7.0 12/01/2010 09:51 AM    CALCIUM 9.7 05/18/2021 08:23 AM    BILITOT 0.3 08/12/2018 11:35 AM    ALKPHOS 79 08/12/2018 11:35 AM    AST 16 08/12/2018 11:35 AM    ALT 20 08/12/2018 11:35 AM     No results found for: PTINR  Lab Results   Component Value Date    LABA1C 7.5 10/31/2020     Lab Results   Component Value Date    TROPONINI 0.24 (H) 10/31/2020       Cardiac, Vascular and Imaging Data all Personally Reviewed in Detail by Myself      EKG:     Echocardiogram:   ECHO 10/31/2020  Normal left ventricular size and wall thickness. There is low normal to mildly reduced systolic function with basal to mid  inferior wall hypokinesis. Ejection fraction is visually estimated to be 45-50%. Grade I diastolic dysfunction with normal LV filling pressures. The right ventricle is normal in size. Right ventricular systolic function is mildly reduced. There are no significant valvular abnormalities appreciated in this study. Stress Test:   Stress test 5/7/21   Suboptimal image quality secondary to bowel artifact. There is a large sized, moderate to severe intensity, mostly fixed inferior    wall defect. Cannot exclude ischemia to the inferior wall secondary to bowel    artifact. Otherwise no evidence of reversible ischemia in the other    territories. Normal LV size and systolic function. Cath:  Cardiac Cath 10/31/2020 (Dr Viridiana Simpson)   Successful PTCA stenting of a subtotally occluded right  coronary artery. The patient was chest pain free after deployment of a  3.5 mm x 15 mm Xience Abby drug-eluting stent. No dissection or dye  extravasation was noted. There was JIE grade III flow with nearly 0%  residual stenosis. There is a 40% stenosis of obtuse marginal branch  #1. It is not the culprit vessel. There is JIE grade III flow there. The remaining coronary arteries appear to be essentially normal.  LV  ejection fraction 40%, inferior wall hypokinesia. Cardiac cath 5/19/2021  ANGIOGRAPHY FINDINGS:  1. Left main normal.  2.  Left anterior descending artery is normal, JIE 3 flow, no stenosis. 3.  Left circumflex patent, JIE 3 flow, no stenosis. 4.  Right coronary artery comes from right coronary cusp. This is a  right dominant system. Distally, there is an older stent.   There is an  edge dissection with 70% stenosis. 5.  LV ejection fraction 60%. SUMMARY:  Successful revascularization of right coronary artery,  placement of one drug-coated stent, 4.0 x 15 post dilating up to 4.5 mm. Other imaging:     Assessment and Plan     Coronary artery disease  S/p Successful revascularization of right coronary artery, placement of one drug-coated stent, 4.0 x 15 post dilating up to 4.5 mm. Asymptomatic. Continue Asa, Plavix, B-blocker and statin therapy. Encouraged regular walking program.     Hyperlipidemia  Continue high intensity statin therapy. Diabetes  Managed by PCP. COPD  Managed by PCP. Follow up in 7 months. Thank you for allowing us to participate in the care of [address]. Please do not hesitate to contact me if you have any questions. Lanetta Dancer, MD, MPH    [address]   [place], [place] [address]  Ph: [phone]  Fax: [phone]    This note was scribed in the presence of Dr Hadley Mora, by Kenny Pierce RN  Physician Attestation:  The scribes documentation has been prepared under my direction and personally reviewed by me in its entirety. I confirm that the note above accurately reflects all work, treatment, procedures, and medical decision making performed by me.

## 2023-02-06 ENCOUNTER — OFFICE VISIT (OUTPATIENT)
Dept: PULMONOLOGY | Age: 63
End: 2023-02-06
Payer: COMMERCIAL

## 2023-02-06 VITALS
HEIGHT: 61 IN | TEMPERATURE: 98.3 F | HEART RATE: 100 BPM | OXYGEN SATURATION: 96 % | BODY MASS INDEX: 33.42 KG/M2 | SYSTOLIC BLOOD PRESSURE: 100 MMHG | RESPIRATION RATE: 18 BRPM | WEIGHT: 177 LBS | DIASTOLIC BLOOD PRESSURE: 64 MMHG

## 2023-02-06 DIAGNOSIS — J31.0 CHRONIC RHINITIS: Primary | ICD-10-CM

## 2023-02-06 DIAGNOSIS — J44.9 STAGE 3 SEVERE COPD BY GOLD CLASSIFICATION (HCC): ICD-10-CM

## 2023-02-06 DIAGNOSIS — R91.1 LUNG NODULE: ICD-10-CM

## 2023-02-06 PROCEDURE — 99213 OFFICE O/P EST LOW 20 MIN: CPT | Performed by: INTERNAL MEDICINE

## 2023-02-06 RX ORDER — AZITHROMYCIN 250 MG/1
250 TABLET, FILM COATED ORAL
Qty: 30 TABLET | Refills: 5 | Status: SHIPPED | OUTPATIENT
Start: 2023-02-06

## 2023-02-06 RX ORDER — FLUTICASONE PROPIONATE 50 MCG
2 SPRAY, SUSPENSION (ML) NASAL DAILY
Qty: 3 EACH | Refills: 5 | Status: SHIPPED | OUTPATIENT
Start: 2023-02-06

## 2023-02-06 NOTE — PROGRESS NOTES
Pulmonary Progress note           REASON FOR CONSULTATION:  Chief Complaint   Patient presents with    Follow-up        Consult at request of [address] [place] [address]     PCP: [address] [place] [address]        Assessment and Plan:   Diagnosis Orders   1. Chronic rhinitis  fluticasone (FLONASE) 50 MCG/ACT nasal spray      2. Lung nodule        3. Stage 3 severe COPD by GOLD classification (HCC)  azithromycin (ZITHROMAX) 250 MG tablet            Plan:  Continue Trelegy daily. Albuterol as needed. Advised to resume intranasal steroid. Continue yearly CT scan screening            HISTORY OF PRESENT ILLNESS: Kylee Jones is very pleasant 66y.o. year old  lady with medical history stated below significant for severe COPD with  FEV1 of 0.6, 36% of predicted which was significantly decreased from  when her FEV1 was 64% of predicted. She had repeat spirometry in the lab 2020 with FEV1 of 1.3, 45% of predicted and improvement with bronchodilator to 52% predicted, a 17% change. Of note, patient does have a brother with COPD and a son with asthma. Also her mother  at age 46 with COPD. Patient did have normal alpha-1 antitrypsin level. 2020 that showed no changes suspicious for lung cancer but did have coronary calcification and bilateral emphysema changes. stopped smoking as of 2020. Had a CT scan done 2022 results of which was as following it was done at [facility]:  Stable scarring within the right upper lobe with a few subcentimeter stable noncalcified nodules. There is a new 2.9 x 1.5 cm nodular density at the right posterior costophrenic sulcus. Given its characteristics, favor an area of rounded atelectasis or    scarring. A true nodule would be of a secondary consideration.  However, recommend follow-up with three-month CT per guidelines below to further evaluate for stability. Moderate emphysematous changes of the lungs. Since she has been using Trelegy her symptoms are significantly improved does occasionally use rescue inhalers. They have 2 dogs    Follow-up CT scan showed resolution of prior 2.9 x 1.5 cm nodular density. Past Medical History:   Diagnosis Date    Asthma     Elevated C-reactive protein (CRP) 3/28/11    hsCRP-6.02    Hemorrhoid     Hyperlipidemia 1/5/07    dyslipidemia (HDL-47), TC-216; HDL-42 & TC-213 on 3/28/11    Hypertriglyceridemia 1/5/07    TG's- 303; TG's-304 on 3/28/11    Menopause 2006    Osteoarthritis     ankles, knees    Overweight(278.02)        Past Surgical History:   Procedure Laterality Date    DIAGNOSTIC CARDIAC CATH LAB [facility] 18       family history includes Cancer (age of onset: 25) in her brother; Heart Disease in her paternal grandmother; Heart Disease (age of onset: 21) in her brother. SOCIAL HISTORY:   reports that she quit smoking about 17 months ago. Her smoking use included cigarettes. She started smoking about 49 years ago. She has a 37.00 pack-year smoking history. She has never been exposed to tobacco smoke.  She has never used smokeless tobacco.      ALLERGIES:  Patient is allergic to penicillins and seasonal.    REVIEW OF SYSTEMS:  Constitutional: Negative for fever, no wt loss, no night sweats   HENT: Negative for sore throat, difficulty swallowing,   Eyes: Negative for redness, no discharge   Respiratory: Chronic cough and shortness of breath with exertion cardiovascular: Negative for chest pain, no palpitations   Gastrointestinal: Negative for vomiting, diarrhea   Genitourinary: Negative for hematuria, no dysuria    Musculoskeletal: Negative for arthralgias, no joint swelling   Skin: Negative for rash  LE: no edema   Neurological: Negative for syncope, no tremor, no focal weakness or dysarthria   Hematological: Negative for adenopathy, or bleeding Psychiatric/Behavorial: Negative for anxiety,    Objective:   PHYSICAL EXAM:  Blood pressure 100/64, pulse 100, temperature 98.3 °F (36.8 °C), resp. rate 18, height 5' 1\" (1.549 m), weight 177 lb (80.3 kg), SpO2 96 %.'  Gen: No acute distress  Eyes: PERRL. No sclera icterus. No conjunctival injection. ENT: No discharge. Pharynx clear. External appearance of ears and nose normal.  Neck: Trachea midline. No obvious mass. Resp: Diminished bilaterally with no active wheezing rales or rhonchi  CV: Regular rate. Regular rhythm. No murmur or rub. No edema. GI: Non-tender. Non-distended. No hernia. Skin: Warm, dry, normal texture and turgor. No nodule on exposed extremities. Lymph: No cervical LAD. M/S: No cyanosis. No clubbing. No joint deformity. LE:  no edema   Neuro: no tremor, no focal deficit, awake and alert   Psych: intact judgement and insight.     Current Outpatient Medications   Medication Sig Dispense Refill    azithromycin (ZITHROMAX) 250 MG tablet Take 1 tablet by mouth three times a week Take one tab three times per week 30 tablet 5    fluticasone (FLONASE) 50 MCG/ACT nasal spray 2 sprays by Each Nostril route daily 3 each 5    atorvastatin (LIPITOR) 80 MG tablet TAKE ONE TABLET BY MOUTH ONCE NIGHTLY 30 tablet 3    metoprolol succinate (TOPROL XL) 25 MG extended release tablet TAKE ONE TABLET BY MOUTH DAILY 30 tablet 3    valsartan (DIOVAN) 40 MG tablet TAKE ONE TABLET BY MOUTH DAILY 30 tablet 3    hyoscyamine (ANASPAZ;LEVSIN) 125 MCG tablet Take 1 tablet by mouth in the morning, at noon, and at bedtime 180 tablet 3    clopidogrel (PLAVIX) 75 MG tablet Take 1 tablet by mouth daily 90 tablet 3    Fluticasone-Umeclidin-Vilant (TRELEGY ELLIPTA) 200-62.5-25 MCG/INH AEPB Inhale 1 puff into the lungs daily      metFORMIN (GLUCOPHAGE) 500 MG tablet Take 1 tablet by mouth 2 times daily (with meals) 60 tablet 3    Blood Glucose Monitoring Suppl (TRUE METRIX METER) w/Device KIT Test blood sugars 3 times a day before meals 1 kit 0    blood glucose test strips (TRUE METRIX BLOOD GLUCOSE TEST) strip 1 each by In Vitro route 3 times daily As needed. 100 each 3    Lancet Devices (LANCING DEVICE) MISC Test blood sugars 3 times a day before meals 1 each 0    Lancets MISC 1 each by Does not apply route 3 times daily 600 each 1    aspirin 81 MG chewable tablet Take 1 tablet by mouth daily 30 tablet 3    fluticasone (FLONASE) 50 MCG/ACT nasal spray 2 sprays by Nasal route daily      ipratropium-albuterol (DUONEB) 0.5-2.5 (3) MG/3ML SOLN nebulizer solution USE 3 MLS WITH NEBULIZER FOUR TIMES DAILY      methocarbamol (ROBAXIN) 750 MG tablet Take 750 mg by mouth 3 times daily      albuterol sulfate HFA (PROAIR HFA) 108 (90 Base) MCG/ACT inhaler Inhale 2 puffs into the lungs every 6 hours as needed for Wheezing 1 Inhaler 0     No current facility-administered medications for this visit. Data Reviewed:   CBC and Renal reviewed  Last CBC  Lab Results   Component Value Date/Time    WBC 9.5 05/18/2021 08:23 AM    RBC 4.32 05/18/2021 08:23 AM    HGB 13.4 05/18/2021 08:23 AM    MCV 93.3 05/18/2021 08:23 AM     05/18/2021 08:23 AM     Last Renal  Lab Results   Component Value Date/Time     05/18/2021 08:23 AM    K 4.5 05/18/2021 08:23 AM    K 3.6 11/01/2020 05:37 AM     05/18/2021 08:23 AM    CO2 24 05/18/2021 08:23 AM    CO2 21 11/01/2020 05:37 AM    CO2 22 10/31/2020 01:37 AM    BUN 13 05/18/2021 08:23 AM    CREATININE 0.8 05/18/2021 08:23 AM    GLUCOSE 107 05/18/2021 08:23 AM    CALCIUM 9.7 05/18/2021 08:23 AM       Last ABG  POC Blood Gas: No results found for: POCPH, POCPCO2, POCPO2, POCHCO3, NBEA, OUPL1PRP  No results for input(s): PH, PCO2, PO2, HCO3, BE, O2SAT in the last 72 hours. Radiology Review:  Pertinent images / reports were reviewed as a part of this visit. CT Chest w/ contrast: No results found for this or any previous visit.       CT Chest w/o contrast: No results found for this or any previous visit. CTPA: No results found for this or any previous visit. CXR PA/LAT: Results for orders placed during the hospital encounter of 08/12/18    XR CHEST STANDARD (2 VW)    Narrative  EXAMINATION:  TWO VIEWS OF THE CHEST    8/12/2018 12:05 pm    COMPARISON:  November 18, 2017    HISTORY:  ORDERING PHYSICIAN PROVIDED HISTORY: cp  TECHNOLOGIST PROVIDED HISTORY:  Technologist Provided Reason for Exam: Shortness of Breath (since friday. worsening. hx of COPD and asthma)  Acuity: Acute  Type of Encounter: Initial  Relevant Medical/Surgical History: COPD asthma smoker    FINDINGS:  The lungs are without acute focal process. There is no effusion or  pneumothorax. The cardiomediastinal silhouette is normal.  Mild degenerative  change of the thoracic spine    Impression  Stable chest without acute process. Pulmonary function testing  As above        This note was transcribed using [id number] [employer]. Please disregard any translational errors.     Merry Madison MD  [facility] Pulmonary, Sleep and Critical Care

## 2023-03-16 RX ORDER — ATORVASTATIN CALCIUM 80 MG/1
TABLET, FILM COATED ORAL
Qty: 90 TABLET | Refills: 3 | Status: SHIPPED | OUTPATIENT
Start: 2023-03-16

## 2023-03-16 RX ORDER — VALSARTAN 40 MG/1
TABLET ORAL
Qty: 90 TABLET | Refills: 3 | Status: SHIPPED | OUTPATIENT
Start: 2023-03-16

## 2023-03-16 RX ORDER — METOPROLOL SUCCINATE 25 MG/1
TABLET, EXTENDED RELEASE ORAL
Qty: 90 TABLET | Refills: 3 | Status: SHIPPED | OUTPATIENT
Start: 2023-03-16

## 2023-05-17 RX ORDER — HYOSCYAMINE SULFATE 0.125 MG
TABLET ORAL
Qty: 90 TABLET | Refills: 3 | Status: SHIPPED | OUTPATIENT
Start: 2023-05-17

## 2023-05-17 NOTE — TELEPHONE ENCOUNTER
Last OV: 12/15/22  Next OV: 8/24/23  Last refill: 7/18/22 #180  3 r/f  Most recent Labs: 1/4/22  In Care everywhere  Last EKG (if needed): 5/12/22

## 2023-06-07 ENCOUNTER — TELEPHONE (OUTPATIENT)
Dept: ORTHOPEDIC SURGERY | Age: 63
End: 2023-06-07

## 2023-06-07 NOTE — TELEPHONE ENCOUNTER
Appointment Request     Patient requesting earlier appointment: Yes  Appointment offered to patient: 6-13-23  [facility]  Patient Contact Number: [phone]

## 2023-06-08 ENCOUNTER — OFFICE VISIT (OUTPATIENT)
Dept: ORTHOPEDIC SURGERY | Age: 63
End: 2023-06-08

## 2023-06-08 VITALS — BODY MASS INDEX: 33.42 KG/M2 | HEIGHT: 61 IN | WEIGHT: 177 LBS

## 2023-06-08 DIAGNOSIS — S80.01XA CONTUSION OF RIGHT KNEE, INITIAL ENCOUNTER: ICD-10-CM

## 2023-06-08 DIAGNOSIS — S76.111A QUADRICEPS STRAIN, RIGHT, INITIAL ENCOUNTER: ICD-10-CM

## 2023-06-08 DIAGNOSIS — M25.561 ACUTE PAIN OF RIGHT KNEE: Primary | ICD-10-CM

## 2023-06-08 DIAGNOSIS — M70.51 PATELLAR BURSITIS OF RIGHT KNEE: ICD-10-CM

## 2023-06-08 NOTE — PROGRESS NOTES
RESIDENT/ATTENDING ATTESTATION:    After medical student / resident evaluation / PA evaluation and exam, I independently gathered patients history, independently did a physical, and agree with A/P as written in medical student's note (other than clarified below). Please see below for additional information documented by the resident/attending including the A/P. Nahed French MD      Chief Complaint  Knee Pain (NP R KNEE )    History of Present Illness:  Sergey Lilly is a 66 y.o. female who is a patient of Dr. Daphnie Pires with pmhx of DM2, s/p coronary stent, HTN, HLD, COPD. The patient is not working. This is evaluated as a personal injury. There was a history of injury. The pain began 9 days ago on 5/30/2023 after tripping on her dog while carrying a laundry basket falling directly onto the anterior portion of her right knee. She did fall onto hardwood floor and there is no high-grade pop or crack or sensation of shifting involving the knee. This was associated with fairly rapid onset severe prepatellar swelling most consistent with a prepatellar bursitis. . Patient complains of right knee pain 2/2 a fall on 5/30. She went to urgent care after and got knee xray which per EMR interpretation showed severe prepatellar soft tissue swelling. The pain is located anterior. She describes the symptoms as aching, pressure, sharp, stabbing, throbbing, and nagging. Symptoms improve with rest, ice. The symptoms are worse with activity, stair climbing, weight bearing, palpation or pressure. The knee has given out or felt unstable. The patient can bend and straighten the knee fully and states that she has improved about 50%. .  The patient is active in none. Treatment to date has been ice, knee sleeve/brace, with some relief. There is no previous history of substantial knee injury. She is being seen today for orthopedic and sports consultation with review of her imaging.     Medical History     Patient's medications,

## 2023-08-22 ENCOUNTER — TELEPHONE (OUTPATIENT)
Dept: CARDIOLOGY CLINIC | Age: 63
End: 2023-08-22

## 2023-08-22 NOTE — TELEPHONE ENCOUNTER
I have reached out to [facility] regarding that Friday schedule,If Zoe Preston cannot move just leave her for Thursday, original appointment Thanks

## 2023-08-22 NOTE — PROGRESS NOTES
Auscultation: Clear to auscultation bilaterally, respirations unlabored. No wheeze, rales   Chest Wall:  No tenderness or deformity. Cardiovascular:    Pulses  Palpation: normal   Ascultation: Regular rate, S1/ S2 normal. No murmur, rub, or gallop. 2+ radial and pedal pulses, symmetric  Carotid  Femoral   Abdomen and Gastrointestinal:   Soft, non-tender, bowel sounds active. Liver and Spleen  Masses   Musculoskeletal: No muscle wasting  Back  Gait   Extremities: Extremities normal, atraumatic. No cyanosis or edema. No cyanosis clubbing       Skin: Inspection and palpation performed, no rashes or lesions. Pysch: Normal mood and affect.  Alert and oriented to time place person   Neurologic: Normal gross motor and sensory exam.       Labs     All labs have been reviewed    Lab Results   Component Value Date/Time    WBC 9.5 05/18/2021 08:23 AM    RBC 4.32 05/18/2021 08:23 AM    HGB 13.4 05/18/2021 08:23 AM    HCT 40.3 05/18/2021 08:23 AM    MCV 93.3 05/18/2021 08:23 AM    RDW 13.8 05/18/2021 08:23 AM     05/18/2021 08:23 AM     Lab Results   Component Value Date/Time     05/18/2021 08:23 AM    K 4.5 05/18/2021 08:23 AM    K 3.6 11/01/2020 05:37 AM     05/18/2021 08:23 AM    CO2 24 05/18/2021 08:23 AM    BUN 13 05/18/2021 08:23 AM    CREATININE 0.8 05/18/2021 08:23 AM    GFRAA >60 05/18/2021 08:23 AM    GFRAA >60 03/28/2011 07:35 AM    AGRATIO 1.3 08/12/2018 11:35 AM    LABGLOM >60 05/18/2021 08:23 AM    GLUCOSE 107 05/18/2021 08:23 AM    PROT 7.2 08/12/2018 11:35 AM    PROT 7.0 12/01/2010 09:51 AM    CALCIUM 9.7 05/18/2021 08:23 AM    BILITOT 0.3 08/12/2018 11:35 AM    ALKPHOS 79 08/12/2018 11:35 AM    AST 16 08/12/2018 11:35 AM    ALT 20 08/12/2018 11:35 AM     No results found for: PTINR  Lab Results   Component Value Date    LABA1C 7.5 10/31/2020     Lab Results   Component Value Date    TROPONINI 0.24 (H) 10/31/2020       Cardiac, Vascular and Imaging Data all Personally Reviewed in Detail

## 2023-08-22 NOTE — TELEPHONE ENCOUNTER
Franchesca's appt was needing rescheduled per Yolanda WOODRUFF because she was a double book on 8/24- I offered 8/25 and she states that is not a good day or time    When can we get her in     [facility] can be reached at [phone]

## 2023-08-23 ENCOUNTER — OFFICE VISIT (OUTPATIENT)
Dept: PULMONOLOGY | Age: 63
End: 2023-08-23
Payer: COMMERCIAL

## 2023-08-23 VITALS
SYSTOLIC BLOOD PRESSURE: 110 MMHG | DIASTOLIC BLOOD PRESSURE: 60 MMHG | TEMPERATURE: 98.4 F | RESPIRATION RATE: 18 BRPM | HEIGHT: 62 IN | OXYGEN SATURATION: 95 % | BODY MASS INDEX: 31.32 KG/M2 | HEART RATE: 96 BPM | WEIGHT: 170.2 LBS

## 2023-08-23 DIAGNOSIS — Z87.891 PERSONAL HISTORY OF TOBACCO USE: Primary | ICD-10-CM

## 2023-08-23 DIAGNOSIS — R91.1 LUNG NODULE: ICD-10-CM

## 2023-08-23 DIAGNOSIS — J44.9 STAGE 3 SEVERE COPD BY GOLD CLASSIFICATION (HCC): ICD-10-CM

## 2023-08-23 PROCEDURE — G0296 VISIT TO DETERM LDCT ELIG: HCPCS | Performed by: INTERNAL MEDICINE

## 2023-08-23 PROCEDURE — 99214 OFFICE O/P EST MOD 30 MIN: CPT | Performed by: INTERNAL MEDICINE

## 2023-08-23 NOTE — PROGRESS NOTES
VW)    Narrative  EXAMINATION:  TWO VIEWS OF THE CHEST    8/12/2018 12:05 pm    COMPARISON:  November 18, 2017    HISTORY:  ORDERING PHYSICIAN PROVIDED HISTORY: cp  TECHNOLOGIST PROVIDED HISTORY:  Technologist Provided Reason for Exam: Shortness of Breath (since friday. worsening. hx of COPD and asthma)  Acuity: Acute  Type of Encounter: Initial  Relevant Medical/Surgical History: COPD asthma smoker    FINDINGS:  The lungs are without acute focal process. There is no effusion or  pneumothorax. The cardiomediastinal silhouette is normal.  Mild degenerative  change of the thoracic spine    Impression  Stable chest without acute process. Pulmonary function testing  As above        This note was transcribed using [facility]. Please disregard any translational errors. Laurie Ospina MD  [facility] Pulmonary, Sleep and Critical Care  Discussed with the patient the current USPSTF guidelines released March 9, 2021 for screening for lung cancer. For adults aged 48 to 80 years who have a 20 pack-year smoking history and currently smoke or have quit within the past 15 years the grade B recommendation is to:  Screen for lung cancer with low-dose computed tomography (LDCT) every year. Stop screening once a person has not smoked for 15 years or has a health problem that limits life expectancy or the ability to have lung surgery. The patient  reports that she quit smoking about 1 years ago. Her smoking use included cigarettes. She started smoking about 50 years ago. She has a 37.00 pack-year smoking history. She has never been exposed to tobacco smoke. She has never used smokeless tobacco.. Discussed with patient the risks and benefits of screening, including over-diagnosis, false positive rate, and total radiation exposure. The patient currently exhibits no signs or symptoms suggestive of lung cancer.   Discussed with patient the importance of compliance with yearly annual lung cancer

## 2023-08-23 NOTE — PATIENT INSTRUCTIONS
6 months follow up   Continue [facility]  Ct screening      Learning About Lung Cancer Screening  What is screening for lung cancer? Lung cancer screening is a way to find some lung cancers early, before a person has any symptoms of the cancer. Lung cancer screening may help those who have the highest risk for lung cancer--people age 48 and older who are or were heavy smokers. For most people, who aren't at increased risk, screening for lung cancer probably isn't helpful. Screening won't prevent cancer. And it may not find all lung cancers. Lung cancer screening may lower the risk of dying from lung cancer in a small number of people. How is it done? Lung cancer screening is done with a low-dose CT (computed tomography) scan. A CT scan uses X-rays, or radiation, to make detailed pictures of your body. Experts recommend that screening be done in medical centers that focus on finding and treating lung cancer. Who is screening recommended for? Lung cancer screening is recommended for people age 48 and older who are or were heavy smokers. That means people with a smoking history of at least 20 pack years. A pack year is a way to measure how heavy a smoker you are or were. To figure out your pack years, multiply how many packs a day on average (assuming 20 cigarettes per pack) you have smoked by how many years you have smoked. For example: If you smoked 1 pack a day for 20 years, that's 1 times 20. So you have a smoking history of 20 pack years. If you smoked 2 packs a day for 10 years, that's 2 times 10. So you have a smoking history of 20 pack years. Experts agree that screening is for people who have a high risk of lung cancer. But experts don't agree on what high risk means. Some say people age 48 or older with at least a 20-pack-year smoking history are high risk. Others say it's people age 54 or older with a 30-pack-year history.   To see if you could benefit from screening, first find out if you are at

## 2023-08-24 ENCOUNTER — OFFICE VISIT (OUTPATIENT)
Dept: CARDIOLOGY CLINIC | Age: 63
End: 2023-08-24
Payer: COMMERCIAL

## 2023-08-24 VITALS
DIASTOLIC BLOOD PRESSURE: 72 MMHG | WEIGHT: 171 LBS | HEART RATE: 97 BPM | HEIGHT: 62 IN | BODY MASS INDEX: 31.47 KG/M2 | SYSTOLIC BLOOD PRESSURE: 112 MMHG | OXYGEN SATURATION: 94 %

## 2023-08-24 DIAGNOSIS — E78.2 MIXED HYPERLIPIDEMIA: Primary | ICD-10-CM

## 2023-08-24 PROCEDURE — 99214 OFFICE O/P EST MOD 30 MIN: CPT | Performed by: INTERNAL MEDICINE

## 2023-09-01 ENCOUNTER — HOSPITAL ENCOUNTER (EMERGENCY)
Age: 63
Discharge: LEFT BEFORE DISCHARGE | End: 2023-09-01

## 2023-09-01 VITALS
OXYGEN SATURATION: 100 % | TEMPERATURE: 97.8 F | SYSTOLIC BLOOD PRESSURE: 134 MMHG | DIASTOLIC BLOOD PRESSURE: 81 MMHG | HEART RATE: 99 BPM | RESPIRATION RATE: 16 BRPM

## 2023-09-01 NOTE — ED TRIAGE NOTES
Patient presents to the ED stating that she was feeling like her ears were a little clogged and she felt inside her R ear and it was bleeding. She does take plavix. Vitals WNL. GCS 15. No active bleeding noted at this time.

## 2023-09-11 ENCOUNTER — HOSPITAL ENCOUNTER (OUTPATIENT)
Dept: CT IMAGING | Age: 63
Discharge: HOME | End: 2023-09-11
Attending: INTERNAL MEDICINE
Payer: COMMERCIAL

## 2023-09-11 DIAGNOSIS — Z87.891 PERSONAL HISTORY OF TOBACCO USE: ICD-10-CM

## 2023-09-11 PROCEDURE — 71271 CT THORAX LUNG CANCER SCR C-: CPT

## 2023-09-12 ENCOUNTER — TELEPHONE (OUTPATIENT)
Dept: CARDIOLOGY CLINIC | Age: 63
End: 2023-09-12

## 2023-09-12 RX ORDER — HYOSCYAMINE SULFATE 0.125 MG
TABLET ORAL
Qty: 90 TABLET | Refills: 3 | OUTPATIENT
Start: 2023-09-12

## 2023-09-18 ENCOUNTER — TELEPHONE (OUTPATIENT)
Dept: PULMONOLOGY | Age: 63
End: 2023-09-18

## 2023-09-18 NOTE — TELEPHONE ENCOUNTER
[address] told Rubio Saavedra that we denied the refill of her Trelegy inhaler. I don't see where Dr Amara Sullivan wrote a prescription for Trelegy. Please write a prescription for Trelegy for her.

## 2023-09-19 DIAGNOSIS — J44.9 STAGE 3 SEVERE COPD BY GOLD CLASSIFICATION (HCC): Primary | ICD-10-CM

## 2023-09-19 RX ORDER — FLUTICASONE FUROATE, UMECLIDINIUM BROMIDE AND VILANTEROL TRIFENATATE 100; 62.5; 25 UG/1; UG/1; UG/1
1 POWDER RESPIRATORY (INHALATION) DAILY
Qty: 1 EACH | Refills: 5 | Status: SHIPPED | OUTPATIENT
Start: 2023-09-19

## 2023-10-16 ENCOUNTER — TELEPHONE (OUTPATIENT)
Dept: CARDIOLOGY CLINIC | Age: 63
End: 2023-10-16

## 2023-10-16 NOTE — TELEPHONE ENCOUNTER
Patient called stating she noticed blood in her R ear. She was evaluated by PCP who referred her to ENT. Patient states she was told the bleeding is related to Plavix. She has also noticed \"knots\" on her L arm with bruising. She denies any injuries. She endorses feeling dizzy, \"like the room is spinning. \"     She has been on Plavix and ASA since 2020; s/p subtotally occluded RCA with [facility] [id number] EMBER 10/2020 and successful revascularization of RCA with EMBER 5/19/2021. Discussed with Dr. Joseph Alicia who states the patient may stop ASA and remain on Plavix. Patient notified, advised to call if no improvement, continued bleeding/bruising. She verbalized understanding.

## 2024-03-04 ENCOUNTER — OFFICE VISIT (OUTPATIENT)
Dept: PULMONOLOGY | Age: 64
End: 2024-03-04
Payer: COMMERCIAL

## 2024-03-04 VITALS
HEART RATE: 93 BPM | SYSTOLIC BLOOD PRESSURE: 126 MMHG | RESPIRATION RATE: 18 BRPM | DIASTOLIC BLOOD PRESSURE: 60 MMHG | TEMPERATURE: 96.6 F | WEIGHT: 179.6 LBS | BODY MASS INDEX: 33.05 KG/M2 | OXYGEN SATURATION: 95 % | HEIGHT: 62 IN

## 2024-03-04 DIAGNOSIS — Z72.0 TOBACCO ABUSE: Primary | ICD-10-CM

## 2024-03-04 DIAGNOSIS — R91.1 LUNG NODULE: ICD-10-CM

## 2024-03-04 DIAGNOSIS — J44.9 STAGE 3 SEVERE COPD BY GOLD CLASSIFICATION (HCC): ICD-10-CM

## 2024-03-04 DIAGNOSIS — Z87.891 PERSONAL HISTORY OF TOBACCO USE: ICD-10-CM

## 2024-03-04 PROCEDURE — 99214 OFFICE O/P EST MOD 30 MIN: CPT | Performed by: INTERNAL MEDICINE

## 2024-03-04 PROCEDURE — 99406 BEHAV CHNG SMOKING 3-10 MIN: CPT | Performed by: INTERNAL MEDICINE

## 2024-03-04 PROCEDURE — G0296 VISIT TO DETERM LDCT ELIG: HCPCS | Performed by: INTERNAL MEDICINE

## 2024-03-04 NOTE — PROGRESS NOTES
Pulmonary Progress note           REASON FOR CONSULTATION:  Chief Complaint   Patient presents with    Follow-up    COPD        Consult at request of Jeremy Eric     PCP: Jeremy Eric        Assessment and Plan:   Diagnosis Orders   1. Tobacco abuse  CT Lung Screen (Initial or Annual)      2. Personal history of tobacco use  CA VISIT TO DISCUSS LUNG CA SCREEN W LDCT    CT Lung Screen (Initial/Annual/Baseline)      3. Stage 3 severe COPD by GOLD classification (HCC)        4. Lung nodule                  Plan:  Continue Trelegy daily.  Albuterol as needed.  Due for CT scan screening CT scan screening  Advised to quit smoking > 3 minutes of counseling given             HISTORY OF PRESENT ILLNESS: Franchesca Lomeli is very pleasant 64 y.o. year old  lady with medical history stated below significant for severe COPD with  FEV1 of 0.6, 36% of predicted which was significantly decreased from  when her FEV1 was 64% of predicted. She had repeat spirometry in the lab 2020 with FEV1 of 1.3, 45% of predicted and improvement with bronchodilator to 52% predicted, a 17% change.  Of note, patient does have a brother with COPD and a son with asthma.  Also her mother  at age 52 with COPD. Patient did have normal alpha-1 antitrypsin level.  2020 that showed no changes suspicious for lung cancer but did have coronary calcification and bilateral emphysema changes.    stopped smoking as of 2020.   Had a CT scan done 2022 results of which was as following it was done at [facility]:  Stable scarring within the right upper lobe with a few subcentimeter stable noncalcified nodules. There is a new 2.9 x 1.5 cm nodular density at the right posterior costophrenic sulcus. Given its characteristics, favor an area of rounded atelectasis or    scarring. A true nodule

## 2024-03-08 RX ORDER — METOPROLOL SUCCINATE 25 MG/1
TABLET, EXTENDED RELEASE ORAL
Qty: 90 TABLET | Refills: 1 | Status: SHIPPED | OUTPATIENT
Start: 2024-03-08

## 2024-03-08 RX ORDER — ATORVASTATIN CALCIUM 80 MG/1
TABLET, FILM COATED ORAL
Qty: 90 TABLET | Refills: 1 | Status: SHIPPED | OUTPATIENT
Start: 2024-03-08

## 2024-03-08 RX ORDER — CLOPIDOGREL BISULFATE 75 MG/1
TABLET ORAL
Qty: 90 TABLET | Refills: 1 | Status: SHIPPED | OUTPATIENT
Start: 2024-03-08

## 2024-03-08 RX ORDER — VALSARTAN 40 MG/1
TABLET ORAL
Qty: 90 TABLET | Refills: 1 | Status: SHIPPED | OUTPATIENT
Start: 2024-03-08

## 2024-03-08 NOTE — TELEPHONE ENCOUNTER
Last OV: 8/24/23  Next OV: X due in 8 months   Last refill: 4/13/23, 3/16/23  Most recent Labs: 6/19/23 in care everywhere  Last EKG (if needed): 5/12/22

## 2024-03-13 DIAGNOSIS — J44.9 STAGE 3 SEVERE COPD BY GOLD CLASSIFICATION (HCC): ICD-10-CM

## 2024-03-13 RX ORDER — AZITHROMYCIN 250 MG/1
TABLET, FILM COATED ORAL
Qty: 12 TABLET | Refills: 5 | Status: SHIPPED | OUTPATIENT
Start: 2024-03-13

## 2024-03-19 DIAGNOSIS — J44.9 STAGE 3 SEVERE COPD BY GOLD CLASSIFICATION (HCC): ICD-10-CM

## 2024-03-19 RX ORDER — FLUTICASONE FUROATE, UMECLIDINIUM BROMIDE AND VILANTEROL TRIFENATATE 100; 62.5; 25 UG/1; UG/1; UG/1
POWDER RESPIRATORY (INHALATION)
Qty: 1 EACH | Refills: 5 | Status: SHIPPED | OUTPATIENT
Start: 2024-03-19

## 2024-07-22 NOTE — PROGRESS NOTES
[facility]  Cardiology Consult    Franchesca Lomeli  1960 July 25, 2024      Follow up    CC: \"I am doing good.\"      Subjective:     History of Present Illness:    Franchesca Lomeli is a 64 y.o. patient with a PMH significant for tobacco use, COPD, and hyperlipidemia.   Today, she is here for follow up. She has been doing okay. She continues to exercise. Patient denies exertional chest pain/pressure, dyspnea at rest, AVILES, PND, orthopnea, palpitations, lightheadedness, weight changes, changes in LE edema, and syncope. Patient reports compliance to her medications.       Past Medical History:   has a past medical history of Asthma, Elevated C-reactive protein (CRP), Hemorrhoid, Hyperlipidemia, Hypertriglyceridemia, Menopause, Osteoarthritis, and Overweight(278.02).    Surgical History:   has a past surgical history that includes Tubal ligation (1986) and Diagnostic Cardiac Cath Lab Procedure.     Social History:   reports that she has been smoking cigarettes. She started smoking about 50 years ago. She has a 48.0 pack-year smoking history. She has been exposed to tobacco smoke. She uses smokeless tobacco. She reports that she does not drink alcohol and does not use drugs.     Family History:  family history includes Cancer (age of onset: 18) in her brother; Heart Disease in her paternal grandmother; Heart Disease (age of onset: 23) in her brother.    Home Medications:  Were reviewed and are listed in nursing record and/or below  Prior to Admission medications    Medication Sig Start Date End Date Taking? Authorizing Provider   fluticasone-umeclidin-vilant (TRELEGY ELLIPTA) 100-62.5-25 MCG/ACT AEPB inhaler INHALE 1 PUFF BY MOUTH DAILY 3/19/24  Yes Porter Hubbard MD   azithromycin (ZITHROMAX) 250 MG tablet TAKE ONE TABLET BY MOUTH THREE TIMES PER WEEK 3/13/24  Yes Porter Hubbard MD   valsartan (DIOVAN) 40 MG tablet TAKE ONE TABLET BY MOUTH DAILY 3/8/24  Yes Dustin Irby MD   atorvastatin

## 2024-07-25 ENCOUNTER — TELEPHONE (OUTPATIENT)
Dept: PULMONOLOGY | Age: 64
End: 2024-07-25

## 2024-07-25 ENCOUNTER — OFFICE VISIT (OUTPATIENT)
Dept: CARDIOLOGY CLINIC | Age: 64
End: 2024-07-25
Payer: COMMERCIAL

## 2024-07-25 VITALS
DIASTOLIC BLOOD PRESSURE: 68 MMHG | BODY MASS INDEX: 31.83 KG/M2 | OXYGEN SATURATION: 95 % | HEIGHT: 62 IN | HEART RATE: 73 BPM | SYSTOLIC BLOOD PRESSURE: 116 MMHG | WEIGHT: 173 LBS

## 2024-07-25 DIAGNOSIS — E78.2 MIXED HYPERLIPIDEMIA: ICD-10-CM

## 2024-07-25 DIAGNOSIS — I25.10 CAD IN NATIVE ARTERY: Primary | ICD-10-CM

## 2024-07-25 PROCEDURE — 99214 OFFICE O/P EST MOD 30 MIN: CPT | Performed by: INTERNAL MEDICINE

## 2024-07-25 PROCEDURE — 93000 ELECTROCARDIOGRAM COMPLETE: CPT | Performed by: INTERNAL MEDICINE

## 2024-07-25 RX ORDER — ASPIRIN 81 MG/1
81 TABLET ORAL DAILY
Qty: 90 TABLET | Refills: 3 | Status: SHIPPED | OUTPATIENT
Start: 2024-07-25

## 2024-07-25 NOTE — TELEPHONE ENCOUNTER
Franchesca called in asking about a $40 payment from March 4th today. Tawanna gave me her info and asked me to call her.   I got out the receipts from that day and there is no paper receipt for a payment from her. I left her a message asking her to check with her bank or [employer] card company to see if there's a $40 charge from that day.  Any additional issues she would contact billing at [phone]

## 2024-08-21 ENCOUNTER — TELEPHONE (OUTPATIENT)
Dept: CASE MANAGEMENT | Age: 64
End: 2024-08-21

## 2024-09-03 RX ORDER — VALSARTAN 40 MG/1
TABLET ORAL
Qty: 30 TABLET | Refills: 5 | Status: SHIPPED | OUTPATIENT
Start: 2024-09-03

## 2024-09-03 RX ORDER — ATORVASTATIN CALCIUM 80 MG/1
TABLET, FILM COATED ORAL
Qty: 30 TABLET | Refills: 5 | Status: SHIPPED | OUTPATIENT
Start: 2024-09-03

## 2024-09-03 RX ORDER — METOPROLOL SUCCINATE 25 MG/1
TABLET, EXTENDED RELEASE ORAL
Qty: 30 TABLET | Refills: 5 | Status: SHIPPED | OUTPATIENT
Start: 2024-09-03

## 2024-09-03 NOTE — TELEPHONE ENCOUNTER
Last OV: 07/25/24  Next OV: 07/31/25  Last refill:03/08/24  Most recent Labs: Care Everywhere 07/11/24 Lipid Panel & CMP  Last EKG (if needed):07/25/24

## 2024-09-12 ENCOUNTER — HOSPITAL ENCOUNTER (OUTPATIENT)
Dept: CT IMAGING | Age: 64
Discharge: HOME | End: 2024-09-12
Attending: INTERNAL MEDICINE
Payer: COMMERCIAL

## 2024-09-12 DIAGNOSIS — Z87.891 PERSONAL HISTORY OF TOBACCO USE: ICD-10-CM

## 2024-09-12 PROCEDURE — 71271 CT THORAX LUNG CANCER SCR C-: CPT

## 2024-09-15 DIAGNOSIS — J44.9 STAGE 3 SEVERE COPD BY GOLD CLASSIFICATION (HCC): ICD-10-CM

## 2024-09-16 RX ORDER — FLUTICASONE FUROATE, UMECLIDINIUM BROMIDE AND VILANTEROL TRIFENATATE 100; 62.5; 25 UG/1; UG/1; UG/1
POWDER RESPIRATORY (INHALATION)
Qty: 60 EACH | Refills: 5 | Status: SHIPPED | OUTPATIENT
Start: 2024-09-16

## 2024-09-24 ENCOUNTER — OFFICE VISIT (OUTPATIENT)
Dept: PULMONOLOGY | Age: 64
End: 2024-09-24
Payer: COMMERCIAL

## 2024-09-24 VITALS
DIASTOLIC BLOOD PRESSURE: 70 MMHG | WEIGHT: 174.8 LBS | HEIGHT: 61 IN | OXYGEN SATURATION: 95 % | TEMPERATURE: 97.9 F | HEART RATE: 82 BPM | RESPIRATION RATE: 18 BRPM | BODY MASS INDEX: 33 KG/M2 | SYSTOLIC BLOOD PRESSURE: 120 MMHG

## 2024-09-24 DIAGNOSIS — Z72.0 TOBACCO ABUSE: Primary | ICD-10-CM

## 2024-09-24 DIAGNOSIS — J44.9 STAGE 3 SEVERE COPD BY GOLD CLASSIFICATION (HCC): ICD-10-CM

## 2024-09-24 DIAGNOSIS — R91.1 LUNG NODULE: ICD-10-CM

## 2024-09-24 PROCEDURE — 99406 BEHAV CHNG SMOKING 3-10 MIN: CPT | Performed by: INTERNAL MEDICINE

## 2024-09-24 PROCEDURE — 99214 OFFICE O/P EST MOD 30 MIN: CPT | Performed by: INTERNAL MEDICINE

## 2024-09-24 RX ORDER — AZITHROMYCIN 250 MG/1
250 TABLET, FILM COATED ORAL
Qty: 15 TABLET | Refills: 5 | Status: SHIPPED | OUTPATIENT
Start: 2024-09-25

## 2024-09-24 RX ORDER — PREDNISONE 20 MG/1
20 TABLET ORAL 2 TIMES DAILY
Qty: 10 TABLET | Refills: 0 | Status: SHIPPED | OUTPATIENT
Start: 2024-09-24 | End: 2024-09-29

## 2024-10-18 DIAGNOSIS — J31.0 CHRONIC RHINITIS: ICD-10-CM

## 2024-10-21 RX ORDER — FLUTICASONE PROPIONATE 50 MCG
2 SPRAY, SUSPENSION (ML) NASAL DAILY
Qty: 3 EACH | Refills: 5 | Status: SHIPPED | OUTPATIENT
Start: 2024-10-21

## 2024-11-01 DIAGNOSIS — J44.9 STAGE 3 SEVERE COPD BY GOLD CLASSIFICATION (HCC): ICD-10-CM

## 2024-11-01 RX ORDER — AZITHROMYCIN 250 MG/1
TABLET, FILM COATED ORAL
Qty: 12 TABLET | Refills: 0 | Status: SHIPPED | OUTPATIENT
Start: 2024-11-01

## 2024-11-01 NOTE — TELEPHONE ENCOUNTER
Pt called and is stating that [facility] said [facility] do not have a script for the zithromax and pt is saying she wants a 15 count so she does not run out

## 2024-12-16 DIAGNOSIS — J44.9 STAGE 3 SEVERE COPD BY GOLD CLASSIFICATION (HCC): Primary | ICD-10-CM

## 2024-12-17 RX ORDER — AZITHROMYCIN 250 MG/1
250 TABLET, FILM COATED ORAL
Qty: 39 TABLET | Refills: 3 | Status: SHIPPED | OUTPATIENT
Start: 2024-12-18

## 2024-12-17 RX ORDER — AZITHROMYCIN 250 MG/1
TABLET, FILM COATED ORAL
Qty: 12 TABLET | Refills: 0 | OUTPATIENT
Start: 2024-12-17

## 2025-02-25 DIAGNOSIS — E78.2 MIXED HYPERLIPIDEMIA: ICD-10-CM

## 2025-02-25 DIAGNOSIS — I25.10 CAD IN NATIVE ARTERY: Primary | ICD-10-CM

## 2025-02-25 NOTE — TELEPHONE ENCOUNTER
Last OV: 24 - Verrma  Next OV: 25 - Irby  Last Labs: Labs ordered 23. Must have labs. Labs ordered and patient notified and will gett ing first thing tomorrow morning 25  Last EK24   Last Filled: 9/3/24 #90 1 Tablet QD 5 RF ( All three medications)

## 2025-02-27 RX ORDER — VALSARTAN 40 MG/1
TABLET ORAL
Qty: 90 TABLET | Refills: 3 | Status: SHIPPED | OUTPATIENT
Start: 2025-02-27

## 2025-02-27 RX ORDER — METOPROLOL SUCCINATE 25 MG/1
TABLET, EXTENDED RELEASE ORAL
Qty: 90 TABLET | Refills: 3 | Status: SHIPPED | OUTPATIENT
Start: 2025-02-27

## 2025-02-27 RX ORDER — ATORVASTATIN CALCIUM 80 MG/1
TABLET, FILM COATED ORAL
Qty: 90 TABLET | Refills: 3 | Status: SHIPPED | OUTPATIENT
Start: 2025-02-27

## 2025-03-03 DIAGNOSIS — I25.10 CAD IN NATIVE ARTERY: ICD-10-CM

## 2025-03-03 DIAGNOSIS — E78.2 MIXED HYPERLIPIDEMIA: ICD-10-CM

## 2025-03-03 LAB
ALBUMIN SERPL-MCNC: 4.1 G/DL (ref 3.4–5)
ALBUMIN/GLOB SERPL: 2 {RATIO} (ref 1.1–2.2)
ALP SERPL-CCNC: 83 U/L (ref 40–129)
ALT SERPL-CCNC: 27 U/L (ref 10–40)
ANION GAP SERPL CALCULATED.3IONS-SCNC: 10 MMOL/L (ref 3–16)
AST SERPL-CCNC: 19 U/L (ref 15–37)
BILIRUB SERPL-MCNC: <0.2 MG/DL (ref 0–1)
BUN SERPL-MCNC: 13 MG/DL (ref 7–20)
CALCIUM SERPL-MCNC: 9.9 MG/DL (ref 8.3–10.6)
CHLORIDE SERPL-SCNC: 105 MMOL/L (ref 99–110)
CHOLEST SERPL-MCNC: 121 MG/DL (ref 0–199)
CO2 SERPL-SCNC: 27 MMOL/L (ref 21–32)
CREAT SERPL-MCNC: 0.9 MG/DL (ref 0.6–1.2)
GFR SERPLBLD CREATININE-BSD FMLA CKD-EPI: 72 ML/MIN/{1.73_M2}
GLUCOSE SERPL-MCNC: 118 MG/DL (ref 70–99)
HDLC SERPL-MCNC: 40 MG/DL (ref 40–60)
LDL CHOLESTEROL: 39 MG/DL
POTASSIUM SERPL-SCNC: 4.4 MMOL/L (ref 3.5–5.1)
PROT SERPL-MCNC: 6.2 G/DL (ref 6.4–8.2)
SODIUM SERPL-SCNC: 142 MMOL/L (ref 136–145)
TRIGL SERPL-MCNC: 212 MG/DL (ref 0–150)
VLDLC SERPL CALC-MCNC: 42 MG/DL

## 2025-03-25 ENCOUNTER — OFFICE VISIT (OUTPATIENT)
Dept: PULMONOLOGY | Age: 65
End: 2025-03-25
Payer: COMMERCIAL

## 2025-03-25 VITALS
SYSTOLIC BLOOD PRESSURE: 122 MMHG | DIASTOLIC BLOOD PRESSURE: 78 MMHG | BODY MASS INDEX: 33.27 KG/M2 | TEMPERATURE: 96.9 F | HEART RATE: 80 BPM | OXYGEN SATURATION: 96 % | RESPIRATION RATE: 18 BRPM | HEIGHT: 61 IN | WEIGHT: 176.2 LBS

## 2025-03-25 DIAGNOSIS — J44.9 STAGE 3 SEVERE COPD BY GOLD CLASSIFICATION (HCC): ICD-10-CM

## 2025-03-25 DIAGNOSIS — Z72.0 TOBACCO ABUSE: ICD-10-CM

## 2025-03-25 DIAGNOSIS — J44.1 COPD WITH ACUTE EXACERBATION (HCC): Primary | ICD-10-CM

## 2025-03-25 PROCEDURE — 99214 OFFICE O/P EST MOD 30 MIN: CPT | Performed by: INTERNAL MEDICINE

## 2025-03-25 PROCEDURE — G2211 COMPLEX E/M VISIT ADD ON: HCPCS | Performed by: INTERNAL MEDICINE

## 2025-03-25 RX ORDER — PREDNISONE 20 MG/1
20 TABLET ORAL 2 TIMES DAILY
Qty: 10 TABLET | Refills: 0 | Status: SHIPPED | OUTPATIENT
Start: 2025-03-25 | End: 2025-03-30

## 2025-03-25 RX ORDER — LEVOFLOXACIN 500 MG/1
500 TABLET, FILM COATED ORAL DAILY
Qty: 7 TABLET | Refills: 0 | Status: SHIPPED | OUTPATIENT
Start: 2025-03-25 | End: 2025-04-01

## 2025-03-25 RX ORDER — FAMOTIDINE 20 MG/1
20 TABLET, FILM COATED ORAL 2 TIMES DAILY PRN
COMMUNITY
Start: 2024-07-08

## 2025-03-25 RX ORDER — AZITHROMYCIN 250 MG/1
250 TABLET, FILM COATED ORAL
Qty: 39 TABLET | Refills: 3 | Status: SHIPPED | OUTPATIENT
Start: 2025-03-26

## 2025-03-25 NOTE — PROGRESS NOTES
Pulmonary Progress note           REASON FOR CONSULTATION:  Chief Complaint   Patient presents with    Follow-up    COPD        Consult at request of Jeremy Eric     PCP: Jeremy Eric        Assessment and Plan:   Diagnosis Orders   1. COPD with acute exacerbation (HCC)        2. Stage 3 severe COPD by GOLD classification ([facility])  azithromycin (ZITHROMAX) 250 MG tablet      3. Tobacco abuse                      Plan:  Continue Trelegy daily.  Prednisone and Levaquin for acute exacerbation  Azithromycin three times per week for recurrent exacerbations  Albuterol as needed.  Advised to quit smoking            HISTORY OF PRESENT ILLNESS: Franchesca Lomeli is very pleasant 65 y.o. year old  lady with medical history stated below significant for severe COPD with  FEV1 of 0.6, 36% of predicted which was significantly decreased from  when her FEV1 was 64% of predicted. She had repeat spirometry in the lab 2020 with FEV1 of 1.3, 45% of predicted and improvement with bronchodilator to 52% predicted, a 17% change.  Of note, patient does have a brother with COPD and a son with asthma.  Also her mother  at age 52 with COPD. Patient did have normal alpha-1 antitrypsin level.  2020 that showed no changes suspicious for lung cancer but did have coronary calcification and bilateral emphysema changes.    stopped smoking as of 2020.   Had a CT scan done 2022 results of which was as following it was done at [facility]:  Stable scarring within the right upper lobe with a few subcentimeter stable noncalcified nodules. There is a new 2.9 x 1.5 cm nodular density at the right posterior costophrenic sulcus. Given its characteristics, favor an area of rounded atelectasis or    scarring. A true nodule would be of a secondary consideration. However, recommend

## 2025-03-31 DIAGNOSIS — J44.9 STAGE 3 SEVERE COPD BY GOLD CLASSIFICATION (HCC): ICD-10-CM

## 2025-03-31 RX ORDER — FLUTICASONE FUROATE, UMECLIDINIUM BROMIDE AND VILANTEROL TRIFENATATE 100; 62.5; 25 UG/1; UG/1; UG/1
1 POWDER RESPIRATORY (INHALATION) DAILY
Qty: 60 EACH | Refills: 5 | Status: SHIPPED | OUTPATIENT
Start: 2025-03-31

## 2025-04-28 ENCOUNTER — TELEPHONE (OUTPATIENT)
Dept: PULMONOLOGY | Age: 65
End: 2025-04-28

## 2025-04-28 DIAGNOSIS — J44.9 STAGE 3 SEVERE COPD BY GOLD CLASSIFICATION (HCC): ICD-10-CM

## 2025-04-28 RX ORDER — AZITHROMYCIN 250 MG/1
250 TABLET, FILM COATED ORAL
Qty: 15 TABLET | Refills: 3 | Status: SHIPPED | OUTPATIENT
Start: 2025-04-28

## 2025-04-28 NOTE — TELEPHONE ENCOUNTER
Pt called to say [facility] said to dispense 15 instead of 12 to last her she only takes 3 a week azithromycin (ZITHROMAX) 250 MG tablet

## 2025-08-25 ENCOUNTER — TELEPHONE (OUTPATIENT)
Dept: CASE MANAGEMENT | Age: 65
End: 2025-08-25

## 2025-09-04 ENCOUNTER — TELEPHONE (OUTPATIENT)
Dept: PULMONOLOGY | Age: 65
End: 2025-09-04

## 2025-09-04 DIAGNOSIS — Z87.891 PERSONAL HISTORY OF TOBACCO USE: Primary | ICD-10-CM
